# Patient Record
Sex: MALE | Race: WHITE | NOT HISPANIC OR LATINO | URBAN - METROPOLITAN AREA
[De-identification: names, ages, dates, MRNs, and addresses within clinical notes are randomized per-mention and may not be internally consistent; named-entity substitution may affect disease eponyms.]

---

## 2022-10-26 ENCOUNTER — INPATIENT (INPATIENT)
Facility: HOSPITAL | Age: 72
LOS: 0 days | Discharge: HOME | End: 2022-10-27
Attending: HOSPITALIST | Admitting: HOSPITALIST

## 2022-10-26 ENCOUNTER — EMERGENCY (EMERGENCY)
Facility: HOSPITAL | Age: 72
LOS: 1 days | Discharge: ANOTHER HOSPITAL | End: 2022-10-26
Attending: STUDENT IN AN ORGANIZED HEALTH CARE EDUCATION/TRAINING PROGRAM
Payer: MEDICARE

## 2022-10-26 VITALS
RESPIRATION RATE: 15 BRPM | HEART RATE: 72 BPM | OXYGEN SATURATION: 97 % | DIASTOLIC BLOOD PRESSURE: 82 MMHG | TEMPERATURE: 99 F | SYSTOLIC BLOOD PRESSURE: 140 MMHG

## 2022-10-26 VITALS
SYSTOLIC BLOOD PRESSURE: 143 MMHG | HEIGHT: 68 IN | DIASTOLIC BLOOD PRESSURE: 80 MMHG | TEMPERATURE: 98 F | WEIGHT: 214.95 LBS | HEART RATE: 89 BPM | OXYGEN SATURATION: 97 %

## 2022-10-26 VITALS
DIASTOLIC BLOOD PRESSURE: 83 MMHG | HEART RATE: 88 BPM | OXYGEN SATURATION: 97 % | TEMPERATURE: 98 F | RESPIRATION RATE: 15 BRPM | SYSTOLIC BLOOD PRESSURE: 158 MMHG

## 2022-10-26 DIAGNOSIS — I25.10 ATHEROSCLEROTIC HEART DISEASE OF NATIVE CORONARY ARTERY WITHOUT ANGINA PECTORIS: ICD-10-CM

## 2022-10-26 DIAGNOSIS — T17.908A UNSPECIFIED FOREIGN BODY IN RESPIRATORY TRACT, PART UNSPECIFIED CAUSING OTHER INJURY, INITIAL ENCOUNTER: ICD-10-CM

## 2022-10-26 DIAGNOSIS — Z95.5 PRESENCE OF CORONARY ANGIOPLASTY IMPLANT AND GRAFT: Chronic | ICD-10-CM

## 2022-10-26 LAB
ALBUMIN SERPL ELPH-MCNC: 4 G/DL — SIGNIFICANT CHANGE UP (ref 3.3–5)
ALBUMIN SERPL ELPH-MCNC: 4.5 G/DL — SIGNIFICANT CHANGE UP (ref 3.3–5)
ALP SERPL-CCNC: 74 U/L — SIGNIFICANT CHANGE UP (ref 40–120)
ALP SERPL-CCNC: 87 U/L — SIGNIFICANT CHANGE UP (ref 40–120)
ALT FLD-CCNC: 27 U/L — SIGNIFICANT CHANGE UP (ref 4–41)
ALT FLD-CCNC: 32 U/L — SIGNIFICANT CHANGE UP (ref 10–45)
ANION GAP SERPL CALC-SCNC: 11 MMOL/L — SIGNIFICANT CHANGE UP (ref 7–14)
ANION GAP SERPL CALC-SCNC: 15 MMOL/L — SIGNIFICANT CHANGE UP (ref 5–17)
APTT BLD: 27.9 SEC — SIGNIFICANT CHANGE UP (ref 27–36.3)
APTT BLD: 31.1 SEC — SIGNIFICANT CHANGE UP (ref 27.5–35.5)
AST SERPL-CCNC: 21 U/L — SIGNIFICANT CHANGE UP (ref 4–40)
AST SERPL-CCNC: 31 U/L — SIGNIFICANT CHANGE UP (ref 10–40)
BASOPHILS # BLD AUTO: 0.05 K/UL — SIGNIFICANT CHANGE UP (ref 0–0.2)
BASOPHILS # BLD AUTO: 0.06 K/UL — SIGNIFICANT CHANGE UP (ref 0–0.2)
BASOPHILS NFR BLD AUTO: 0.7 % — SIGNIFICANT CHANGE UP (ref 0–2)
BASOPHILS NFR BLD AUTO: 0.9 % — SIGNIFICANT CHANGE UP (ref 0–2)
BILIRUB SERPL-MCNC: 1.8 MG/DL — HIGH (ref 0.2–1.2)
BILIRUB SERPL-MCNC: 2.1 MG/DL — HIGH (ref 0.2–1.2)
BUN SERPL-MCNC: 14 MG/DL — SIGNIFICANT CHANGE UP (ref 7–23)
CALCIUM SERPL-MCNC: 9.3 MG/DL — SIGNIFICANT CHANGE UP (ref 8.4–10.5)
CALCIUM SERPL-MCNC: 9.9 MG/DL — SIGNIFICANT CHANGE UP (ref 8.4–10.5)
CHLORIDE SERPL-SCNC: 100 MMOL/L — SIGNIFICANT CHANGE UP (ref 96–108)
CHLORIDE SERPL-SCNC: 105 MMOL/L — SIGNIFICANT CHANGE UP (ref 98–107)
CO2 SERPL-SCNC: 21 MMOL/L — LOW (ref 22–31)
CO2 SERPL-SCNC: 23 MMOL/L — SIGNIFICANT CHANGE UP (ref 22–31)
CREAT SERPL-MCNC: 1.05 MG/DL — SIGNIFICANT CHANGE UP (ref 0.5–1.3)
CREAT SERPL-MCNC: 1.09 MG/DL — SIGNIFICANT CHANGE UP (ref 0.5–1.3)
EGFR: 73 ML/MIN/1.73M2 — SIGNIFICANT CHANGE UP
EGFR: 76 ML/MIN/1.73M2 — SIGNIFICANT CHANGE UP
EOSINOPHIL # BLD AUTO: 0.21 K/UL — SIGNIFICANT CHANGE UP (ref 0–0.5)
EOSINOPHIL # BLD AUTO: 0.24 K/UL — SIGNIFICANT CHANGE UP (ref 0–0.5)
EOSINOPHIL NFR BLD AUTO: 3.1 % — SIGNIFICANT CHANGE UP (ref 0–6)
EOSINOPHIL NFR BLD AUTO: 3.5 % — SIGNIFICANT CHANGE UP (ref 0–6)
FLUAV AG NPH QL: SIGNIFICANT CHANGE UP
FLUBV AG NPH QL: SIGNIFICANT CHANGE UP
GLUCOSE SERPL-MCNC: 153 MG/DL — HIGH (ref 70–99)
GLUCOSE SERPL-MCNC: 92 MG/DL — SIGNIFICANT CHANGE UP (ref 70–99)
HCT VFR BLD CALC: 42.4 % — SIGNIFICANT CHANGE UP (ref 39–50)
HCT VFR BLD CALC: 46 % — SIGNIFICANT CHANGE UP (ref 39–50)
HGB BLD-MCNC: 14.4 G/DL — SIGNIFICANT CHANGE UP (ref 13–17)
HGB BLD-MCNC: 15.1 G/DL — SIGNIFICANT CHANGE UP (ref 13–17)
IANC: 4.76 K/UL — SIGNIFICANT CHANGE UP (ref 1.8–7.4)
IMM GRANULOCYTES NFR BLD AUTO: 0.4 % — SIGNIFICANT CHANGE UP (ref 0–0.9)
IMM GRANULOCYTES NFR BLD AUTO: 0.6 % — SIGNIFICANT CHANGE UP (ref 0–0.9)
INR BLD: 1.2 RATIO — HIGH (ref 0.88–1.16)
INR BLD: 1.26 RATIO — HIGH (ref 0.88–1.16)
LYMPHOCYTES # BLD AUTO: 1.09 K/UL — SIGNIFICANT CHANGE UP (ref 1–3.3)
LYMPHOCYTES # BLD AUTO: 1.13 K/UL — SIGNIFICANT CHANGE UP (ref 1–3.3)
LYMPHOCYTES # BLD AUTO: 16.2 % — SIGNIFICANT CHANGE UP (ref 13–44)
LYMPHOCYTES # BLD AUTO: 16.4 % — SIGNIFICANT CHANGE UP (ref 13–44)
MCHC RBC-ENTMCNC: 29.7 PG — SIGNIFICANT CHANGE UP (ref 27–34)
MCHC RBC-ENTMCNC: 29.9 PG — SIGNIFICANT CHANGE UP (ref 27–34)
MCHC RBC-ENTMCNC: 32.8 GM/DL — SIGNIFICANT CHANGE UP (ref 32–36)
MCHC RBC-ENTMCNC: 34 GM/DL — SIGNIFICANT CHANGE UP (ref 32–36)
MCV RBC AUTO: 88.1 FL — SIGNIFICANT CHANGE UP (ref 80–100)
MCV RBC AUTO: 90.4 FL — SIGNIFICANT CHANGE UP (ref 80–100)
MONOCYTES # BLD AUTO: 0.65 K/UL — SIGNIFICANT CHANGE UP (ref 0–0.9)
MONOCYTES # BLD AUTO: 0.7 K/UL — SIGNIFICANT CHANGE UP (ref 0–0.9)
MONOCYTES NFR BLD AUTO: 10.1 % — SIGNIFICANT CHANGE UP (ref 2–14)
MONOCYTES NFR BLD AUTO: 9.7 % — SIGNIFICANT CHANGE UP (ref 2–14)
NEUTROPHILS # BLD AUTO: 4.66 K/UL — SIGNIFICANT CHANGE UP (ref 1.8–7.4)
NEUTROPHILS # BLD AUTO: 4.76 K/UL — SIGNIFICANT CHANGE UP (ref 1.8–7.4)
NEUTROPHILS NFR BLD AUTO: 68.9 % — SIGNIFICANT CHANGE UP (ref 43–77)
NEUTROPHILS NFR BLD AUTO: 69.5 % — SIGNIFICANT CHANGE UP (ref 43–77)
NRBC # BLD: 0 /100 WBCS — SIGNIFICANT CHANGE UP (ref 0–0)
NRBC # FLD: 0 K/UL — SIGNIFICANT CHANGE UP (ref 0–0)
PLATELET # BLD AUTO: 218 K/UL — SIGNIFICANT CHANGE UP (ref 150–400)
PLATELET # BLD AUTO: 221 K/UL — SIGNIFICANT CHANGE UP (ref 150–400)
POTASSIUM SERPL-MCNC: 4 MMOL/L — SIGNIFICANT CHANGE UP (ref 3.5–5.3)
POTASSIUM SERPL-MCNC: 4.6 MMOL/L — SIGNIFICANT CHANGE UP (ref 3.5–5.3)
POTASSIUM SERPL-SCNC: 4 MMOL/L — SIGNIFICANT CHANGE UP (ref 3.5–5.3)
POTASSIUM SERPL-SCNC: 4.6 MMOL/L — SIGNIFICANT CHANGE UP (ref 3.5–5.3)
PROT SERPL-MCNC: 6.8 G/DL — SIGNIFICANT CHANGE UP (ref 6–8.3)
PROT SERPL-MCNC: 7.5 G/DL — SIGNIFICANT CHANGE UP (ref 6–8.3)
PROTHROM AB SERPL-ACNC: 13.9 SEC — HIGH (ref 10.5–13.4)
PROTHROM AB SERPL-ACNC: 14.5 SEC — HIGH (ref 10.5–13.4)
RBC # BLD: 4.81 M/UL — SIGNIFICANT CHANGE UP (ref 4.2–5.8)
RBC # BLD: 5.09 M/UL — SIGNIFICANT CHANGE UP (ref 4.2–5.8)
RBC # FLD: 13.2 % — SIGNIFICANT CHANGE UP (ref 10.3–14.5)
RBC # FLD: 13.4 % — SIGNIFICANT CHANGE UP (ref 10.3–14.5)
RSV RNA NPH QL NAA+NON-PROBE: SIGNIFICANT CHANGE UP
SARS-COV-2 RNA SPEC QL NAA+PROBE: SIGNIFICANT CHANGE UP
SODIUM SERPL-SCNC: 136 MMOL/L — SIGNIFICANT CHANGE UP (ref 135–145)
SODIUM SERPL-SCNC: 139 MMOL/L — SIGNIFICANT CHANGE UP (ref 135–145)
WBC # BLD: 6.71 K/UL — SIGNIFICANT CHANGE UP (ref 3.8–10.5)
WBC # BLD: 6.91 K/UL — SIGNIFICANT CHANGE UP (ref 3.8–10.5)
WBC # FLD AUTO: 6.71 K/UL — SIGNIFICANT CHANGE UP (ref 3.8–10.5)
WBC # FLD AUTO: 6.91 K/UL — SIGNIFICANT CHANGE UP (ref 3.8–10.5)

## 2022-10-26 PROCEDURE — 85025 COMPLETE CBC W/AUTO DIFF WBC: CPT

## 2022-10-26 PROCEDURE — 99223 1ST HOSP IP/OBS HIGH 75: CPT

## 2022-10-26 PROCEDURE — 85610 PROTHROMBIN TIME: CPT

## 2022-10-26 PROCEDURE — 36415 COLL VENOUS BLD VENIPUNCTURE: CPT

## 2022-10-26 PROCEDURE — 93010 ELECTROCARDIOGRAM REPORT: CPT

## 2022-10-26 PROCEDURE — 86850 RBC ANTIBODY SCREEN: CPT

## 2022-10-26 PROCEDURE — 71250 CT THORAX DX C-: CPT | Mod: MA

## 2022-10-26 PROCEDURE — 80053 COMPREHEN METABOLIC PANEL: CPT

## 2022-10-26 PROCEDURE — 99285 EMERGENCY DEPT VISIT HI MDM: CPT | Mod: FS

## 2022-10-26 PROCEDURE — 99285 EMERGENCY DEPT VISIT HI MDM: CPT | Mod: 25

## 2022-10-26 PROCEDURE — 99285 EMERGENCY DEPT VISIT HI MDM: CPT

## 2022-10-26 PROCEDURE — 85730 THROMBOPLASTIN TIME PARTIAL: CPT

## 2022-10-26 PROCEDURE — 71045 X-RAY EXAM CHEST 1 VIEW: CPT | Mod: 26

## 2022-10-26 PROCEDURE — 71250 CT THORAX DX C-: CPT | Mod: 26,MA

## 2022-10-26 PROCEDURE — 86901 BLOOD TYPING SEROLOGIC RH(D): CPT

## 2022-10-26 PROCEDURE — 93005 ELECTROCARDIOGRAM TRACING: CPT

## 2022-10-26 PROCEDURE — 71045 X-RAY EXAM CHEST 1 VIEW: CPT

## 2022-10-26 PROCEDURE — 87637 SARSCOV2&INF A&B&RSV AMP PRB: CPT

## 2022-10-26 PROCEDURE — 99284 EMERGENCY DEPT VISIT MOD MDM: CPT | Mod: GC

## 2022-10-26 PROCEDURE — 86900 BLOOD TYPING SEROLOGIC ABO: CPT

## 2022-10-26 RX ORDER — SODIUM CHLORIDE 9 MG/ML
1000 INJECTION, SOLUTION INTRAVENOUS
Refills: 0 | Status: DISCONTINUED | OUTPATIENT
Start: 2022-10-26 | End: 2022-10-27

## 2022-10-26 RX ORDER — DEXTROSE 50 % IN WATER 50 %
15 SYRINGE (ML) INTRAVENOUS ONCE
Refills: 0 | Status: DISCONTINUED | OUTPATIENT
Start: 2022-10-26 | End: 2022-10-27

## 2022-10-26 RX ORDER — DEXTROSE 50 % IN WATER 50 %
25 SYRINGE (ML) INTRAVENOUS ONCE
Refills: 0 | Status: DISCONTINUED | OUTPATIENT
Start: 2022-10-26 | End: 2022-10-27

## 2022-10-26 RX ORDER — GLUCAGON INJECTION, SOLUTION 0.5 MG/.1ML
1 INJECTION, SOLUTION SUBCUTANEOUS ONCE
Refills: 0 | Status: DISCONTINUED | OUTPATIENT
Start: 2022-10-26 | End: 2022-10-27

## 2022-10-26 RX ORDER — INSULIN LISPRO 100/ML
VIAL (ML) SUBCUTANEOUS
Refills: 0 | Status: DISCONTINUED | OUTPATIENT
Start: 2022-10-26 | End: 2022-10-27

## 2022-10-26 RX ORDER — DEXTROSE 50 % IN WATER 50 %
12.5 SYRINGE (ML) INTRAVENOUS ONCE
Refills: 0 | Status: DISCONTINUED | OUTPATIENT
Start: 2022-10-26 | End: 2022-10-27

## 2022-10-26 RX ORDER — INSULIN LISPRO 100/ML
VIAL (ML) SUBCUTANEOUS AT BEDTIME
Refills: 0 | Status: DISCONTINUED | OUTPATIENT
Start: 2022-10-26 | End: 2022-10-27

## 2022-10-26 NOTE — H&P ADULT - PROBLEM SELECTOR PLAN 6
Pt on Eliquis 5 mg BID but does not know the exact indication for it. Pt reports that he started Eliquis ~6 months ago after he was told that he had an abnormal rhythm. Possibly afib.   - Per cardiology recs, hold Eliquis for now, as pt also pending possible flex bronchoscopy  - Given pt is on DAPT with ASA and Plavix, will need to follow up with pt's cardiologist (Dr. Stein in [facility] [phone]) regarding exact indication for Eliquis and whether current regimen of ASA, Plavix, and Eliquis should be adjusted due to bleeding risk

## 2022-10-26 NOTE — H&P ADULT - NSHPREVIEWOFSYSTEMS_GEN_ALL_CORE
Constitutional: No generalized weakness, fevers, chills, or weight loss  Eyes: No visual changes, double vision, or eye pain  Ears, Nose, Mouth, Throat: No runny nose, sinus pain, ear pain, tinnitus, sore throat, dysphagia, or odynophagia  Cardiovascular: No chest pain, palpitations, or LE edema  Respiratory: No cough, wheezing, hemoptysis, or shortness of breath  Gastrointestinal: No abdominal pain, dysphagia, anorexia, nausea/vomiting, diarrhea/constipation, hematemesis, melena, or BRBPR  Genitourinary: No dysuria, frequency, urgency, or hematuria  Musculoskeletal: No joint pain, joint swelling, or decreased ROM  Skin: No pruritus, rashes, lesions, or wounds  Neurologic:  No seizures, headache, paresthesias, numbness, or limb weakness  Psychiatric: No depression, anxiety, difficulty concentrating, anhedonia, or lack of energy  Endocrine: No heat/cold intolerance, mood swings, sweats, polydipsia, or polyuria  Hematologic/lymphatic: No purpura, petechia, or prolonged or excessive bleeding after dental extraction / injury  Allergic/Immunologic: No anaphylaxis or allergic response to materials, foods, animals    Positives and pertinent negatives noted and all other systems negative. Constitutional: No generalized weakness, fevers, chills, or weight loss  Eyes: No visual changes, double vision, or eye pain  Ears, Nose, Mouth, Throat: No runny nose, sinus pain, ear pain, tinnitus, sore throat, dysphagia, or odynophagia  Cardiovascular: No chest pain, palpitations, or LE edema  Respiratory: No cough, wheezing, hemoptysis, or shortness of breath  Gastrointestinal: No abdominal pain, nausea/vomiting, diarrhea/constipation, hematemesis, melena, or BRBPR  Genitourinary: No dysuria, frequency, urgency, or hematuria  Musculoskeletal: No back pain or joint pain, swelling, or decreased ROM  Skin: No pruritus or rashes  Neurologic: No syncope, seizures, headaches, paresthesias, numbness, or limb weakness  Psychiatric: No depression, anxiety, or agitation  Endocrine: No heat/cold intolerance, mood swings, sweats, polydipsia, or polyuria  Hematologic/lymphatic: No purpura, petechia, or prolonged or excessive bleeding after dental extraction / injury  Allergic/Immunologic: No anaphylaxis or allergic response to materials, foods, animals    Positives and pertinent negatives noted and all other systems negative.

## 2022-10-26 NOTE — ED ADULT TRIAGE NOTE - INTERNATIONAL TRAVEL
Unable to Assess Winlevi Counseling:  I discussed with the patient the risks of topical clascoterone including but not limited to erythema, scaling, itching, and stinging. Patient voiced their understanding.

## 2022-10-26 NOTE — H&P ADULT - PROBLEM SELECTOR PLAN 4
Pt on Metformin and Glipizide at home.  - Hold oral hypoglycemic agents while inpatient  - Start low-dose ISS and FS checks q6hrs while NPO and qAC TID and qHS while on PO diet  - Check A1c

## 2022-10-26 NOTE — H&P ADULT - HISTORY OF PRESENT ILLNESS
73 yo man with history of CAD s/p LOUISE to [facility] (July 2022, on ASA and Plavix), ?afib (on Eliquis), type 2 DM (not on insulin), and HTN presents as a transfer from [facility] ED for flex bronchoscopy by interventional pulmonology for removal of dental crown within the bronchus intermedius. Pt states that while he was driving Wednesday afternoon, he suddenly aspirated on one of his dental crowns. Pt denies any symptoms after the episode. No chest pain, shortness of breath, stridor, drooling, dysphagia, odynophagia, cough, hemoptysis, or vomiting. Pt was able to drive himself to [facility] ED from [place]. At [facility] ED, pt had a CT chest noncontrast that showed a 1 cm foreign body (tooth crown) within the bronchus intermedius. Pt was evaluated by pulmonology there and recommended to be transferred to [facility] ED to be further evaluated by interventional pulmonology. Pt is now pending a flex bronchoscopy. Pt continues to be asymptomatic. Denies any recent fevers, chills, abdominal pain, nausea, vomiting, diarrhea, constipation, melena, BRBPR, or urinary symptoms.     Of note, pt is on Eliquis 5 mg BID but does not know the exact indication for it. Pt reports that he started Eliquis ~6 months ago after he was told that he had an abnormal rhythm.     In [facility] ED,  T 98F, HR 88, /83, RR 15, SpO2 97% RA.

## 2022-10-26 NOTE — ED PROVIDER NOTE - CLINICAL SUMMARY MEDICAL DECISION MAKING FREE TEXT BOX
Girish Malloy MD: 72-year-old male with past medical history of CAD status post stents on Plavix, HTN/HLD who presents with some difficulty swallowing with some chest pain after swallowing what he states he thinks was his tooth crown/filling. Patient tolerating secretions not hypoxic or any acute distress and nontoxic-appearing.  Bedside x-ray showed what appears to be a hyperechoic structure possibly in the right bronchus.  Patient ordered for Noncon CT pulm consulted will get basic labs and EKG obtained. Girish Malloy MD: 72-year-old male with past medical history of CAD status post stents on Plavix, HTN/HLD who presents with some difficulty swallowing with some chest pain after swallowing what he states he thinks was his tooth crown/filling. Patient tolerating secretions not hypoxic or any acute distress and nontoxic-appearing.  Bedside x-ray showed what appears to be a hyperechoic structure possibly in the right bronchus.  Patient ordered for Noncon CT, pulm consulted will get basic labs and EKG obtained.

## 2022-10-26 NOTE — ED ADULT NURSE NOTE - CHIEF COMPLAINT QUOTE
EMS states" patient is a transfer from [place] for Foreign body in right bronchus" patient crown  from dental found in his right bronchi. patient denies any complaints. IV to left AC 18 Angiocath noted.

## 2022-10-26 NOTE — ED ADULT NURSE NOTE - NSIMPLEMENTINTERV_GEN_ALL_ED
Implemented All Universal Safety Interventions:  Waterproof to call system. Call bell, personal items and telephone within reach. Instruct patient to call for assistance. Room bathroom lighting operational. Non-slip footwear when patient is off stretcher. Physically safe environment: no spills, clutter or unnecessary equipment. Stretcher in lowest position, wheels locked, appropriate side rails in place.

## 2022-10-26 NOTE — H&P ADULT - ASSESSMENT
73 yo man with history of CAD s/p LOUISE to [facility] (July 2022, on ASA and Plavix), ?afib (on Eliquis), type 2 DM (not on insulin), and HTN presents as a transfer from [facility] ED for flex bronchoscopy by interventional pulmonology for removal of dental crown within the bronchus intermedius.

## 2022-10-26 NOTE — H&P ADULT - PROBLEM SELECTOR PLAN 3
S/p LOUISE to [facility] in July 2022 after an abnormal nuclear stress test. On ASA and Plavix. Pt with no anginal symptoms at this time.  - C/w ASA 81 mg daily and Plavix 75 mg daily   - C/w simvastatin 10 mg qHS for now. Unclear why pt is not on a high-intensity statin. Check lipid profile. Will need to follow up with pt's cardiologist (Dr. Stein in [facility] [phone]).  - C/w metoprolol succinate 50 mg daily  - Cardiology consulted, appreciate prelim recs. F/u final recs, including if pt requires any cardiac workup prior to flex bronchoscopy. S/p LOUISE to [facility] in July 2022 after an abnormal nuclear stress test. On ASA and Plavix. Pt with no anginal symptoms at this time.  - C/w ASA 81 mg daily and Plavix 75 mg daily   - C/w simvastatin 10 mg qHS for now. Unclear why pt is not on a high-intensity statin. Check lipid profile. Will need to follow up with pt's cardiologist (Dr. Stein in [facility] [phone]).  - C/w metoprolol succinate 50 mg daily  - TTE ordered per cardiology recs  - Cardiology consulted, appreciate prelim recs. F/u final recs, including if pt requires any further cardiac workup besides TTE prior to flex bronchoscopy.

## 2022-10-26 NOTE — H&P ADULT - PROBLEM SELECTOR PLAN 8
- DVT ppx: Pt on Eliquis at home. Hold Eliquis for now. Will need to follow up with pt's cardiologist (Dr. Stein in [facility] [phone]) regarding exact indication for Eliquis and whether current regimen of ASA, Plavix, and Eliquis should be adjusted due to bleeding risk.  - Diet: NPO for now pending possible flex bronchoscopy

## 2022-10-26 NOTE — ED PROVIDER NOTE - NS ED ROS FT
Review of Systems:  - Constitutional: no chills, no fever, no night sweats, no weight loss  - Mouth/Throat: no sore throat  - Cardiovascular: + chest pain, no edema   - Respiratory: + cough, + shortness of breath  - Gastrointestinal: no abdominal pain, no diarrhea, no melena, no nausea, no vomiting  - Genitourinary:  no dysuria, no hematuria  - MSK: FROM of all extremities, no muscle or joint pain  - Skin: no rash  - Neurological: no change in level of consciousness, no headache

## 2022-10-26 NOTE — ED ADULT NURSE NOTE - OBJECTIVE STATEMENT
break coverage RN: samson A&ox4, transferred from [place] ED for pulmonology consult. pt states he inhaled crown from tooth today. pt denies cough, or blood in mucus. pt denies Chest pain and SOB. pt denies H/A, Dizziness, lightheadedness, and radiating chest pain. breathing is spontaneous and unlabored. sating 99% on RA. bilateral pedal and radial pulses palpable and strong. left 20g IV placed  by [facility]. IV is patent, no signs of infection, redness, or infiltration/ Labs drawn and sent as per ordered. Bed in lowest position, call bell within reach, all other safety and comfort measures provided. awaiting labs results and further orders. report given to primary RN.

## 2022-10-26 NOTE — H&P ADULT - PROBLEM SELECTOR PLAN 2
Pt with isolated t bili elevation to 1.8. Pt with no abdominal complaints or abdominal tenderness on exam. 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  - Trend LFTs for now

## 2022-10-26 NOTE — H&P ADULT - NSHPSOCIALHISTORY_GEN_ALL_CORE
Denies any history of tobacco or illicit drug use.  Drinks alcohol seldomly. 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

## 2022-10-26 NOTE — H&P ADULT - NSHPLABSRESULTS_GEN_ALL_CORE
EKG personally reviewed.    Labs personally reviewed.                        14.4   6.91  )-----------( 218      ( 26 Oct 2022 19:30 )             42.4     10-26    139  |  105  |  14  ----------------------------<  92  4.0   |  23  |  1.05    Ca    9.3      26 Oct 2022 19:30    TPro  6.8  /  Alb  4.0  /  TBili  1.8<H>  /  DBili  x   /  AST  21  /  ALT  27  /  AlkPhos  74  10-26    Imaging personally reviewed.  ACC: [id number] EXAM:  CT CHEST                        PROCEDURE DATE:  10/26/2022    FINDINGS:  AIRWAYS, LUNGS and PLEURA: There is a 1 cm foreign body (tooth crown) within the bronchus intermedius, at the bifurcation. The airways are otherwise patent. Clear lungs. No atelectasis or consolidation. No pleural effusion.    MEDIASTINUM AND COLLETTE: No lymphadenopathy.    HEART AND VESSELS: Heart size is normal. No pericardial effusion. Thoracic aorta and pulmonary artery normal in diameter. Mild coronary calcification.    VISUALIZED UPPER ABDOMEN: Within normal limits.    CHEST WALL AND BONES: No aggressive osseous lesion.    LOWER NECK: The right lobe of the thyroid gland is mildly enlarged with substernal extension.    IMPRESSION:  1 cm foreign body (tooth crown) within the bronchus intermedius.    ACC: [id number] EXAM:  XR CHEST AP OR PA 1V                        PROCEDURE DATE:  10/26/2022    FINDINGS:  LINES/TUBES/SUPPORT DEVICES: None    LUNGS: Radiopaque foreign body likely within the bronchus intermedius. The lungs are clear.    PLEURA: No pleural effusion or pneumothorax.    HEART AND MEDIASTINUM: Heart size is within normal limits.    BONES: No acute bony abnormality.    IMPRESSION:  Radiopaque foreign body, representing ingested crown, likely within the bronchus intermedius. Consider bronchoscopy or CT of the chest for further evaluation, depending on the patient's clinical status. EKG personally reviewed.  NSR at 74 bpm, TWI in III and aVF, no STD or NEETU, QTc 468 ms.    Labs personally reviewed.                        14.4   6.91  )-----------( 218      ( 26 Oct 2022 19:30 )             42.4     10-26    139  |  105  |  14  ----------------------------<  92  4.0   |  23  |  1.05    Ca    9.3      26 Oct 2022 19:30    TPro  6.8  /  Alb  4.0  /  TBili  1.8<H>  /  DBili  x   /  AST  21  /  ALT  27  /  AlkPhos  74  10-26    Imaging personally reviewed.  ACC: [id number] EXAM:  CT CHEST                        PROCEDURE DATE:  10/26/2022    FINDINGS:  AIRWAYS, LUNGS and PLEURA: There is a 1 cm foreign body (tooth crown) within the bronchus intermedius, at the bifurcation. The airways are otherwise patent. Clear lungs. No atelectasis or consolidation. No pleural effusion.    MEDIASTINUM AND COLLETTE: No lymphadenopathy.    HEART AND VESSELS: Heart size is normal. No pericardial effusion. Thoracic aorta and pulmonary artery normal in diameter. Mild coronary calcification.    VISUALIZED UPPER ABDOMEN: Within normal limits.    CHEST WALL AND BONES: No aggressive osseous lesion.    LOWER NECK: The right lobe of the thyroid gland is mildly enlarged with substernal extension.    IMPRESSION:  1 cm foreign body (tooth crown) within the bronchus intermedius.    ACC: [id number] EXAM:  XR CHEST AP OR PA 1V                        PROCEDURE DATE:  10/26/2022    FINDINGS:  LINES/TUBES/SUPPORT DEVICES: None    LUNGS: Radiopaque foreign body likely within the bronchus intermedius. The lungs are clear.    PLEURA: No pleural effusion or pneumothorax.    HEART AND MEDIASTINUM: Heart size is within normal limits.    BONES: No acute bony abnormality.    IMPRESSION:  Radiopaque foreign body, representing ingested crown, likely within the bronchus intermedius. Consider bronchoscopy or CT of the chest for further evaluation, depending on the patient's clinical status.

## 2022-10-26 NOTE — CONSULT NOTE ADULT - SUBJECTIVE AND OBJECTIVE BOX
CHIEF COMPLAINT:Patient is a 72y old  Male who presents with a chief complaint of     HPI:      PAST MEDICAL & SURGICAL HISTORY:  HTN (hypertension)      Hyperlipemia      CAD (coronary artery disease)      Stented coronary artery          FAMILY HISTORY:      SOCIAL HISTORY:  Smoking: [ ] Never Smoked [ ] Former Smoker (__ packs x ___ years) [ ] Current Smoker  (__ packs x ___ years)  Substance Use: [ ] Never Used [ ] Used ____  EtOH Use:  Marital Status: [ ] Single [ ]  [ ]  [ ]   Sexual History:   Occupation:  Recent Travel:  Country of Birth:  Advance Directives:    Allergies    No Known Allergies    Intolerances        HOME MEDICATIONS:  Home Medications:      REVIEW OF SYSTEMS:  Constitutional: [ ] negative [ ] fevers [ ] chills [ ] weight loss [ ] weight gain  HEENT: [ ] negative [ ] dry eyes [ ] eye irritation [ ] postnasal drip [ ] nasal congestion  CV: [ ] negative  [ ] chest pain [ ] orthopnea [ ] palpitations [ ] murmur  Resp: [ ] negative [ ] cough [ ] shortness of breath [ ] dyspnea [ ] wheezing [ ] sputum [ ] hemoptysis  GI: [ ] negative [ ] nausea [ ] vomiting [ ] diarrhea [ ] constipation [ ] abd pain [ ] dysphagia   : [ ] negative [ ] dysuria [ ] nocturia [ ] hematuria [ ] increased urinary frequency  Musculoskeletal: [ ] negative [ ] back pain [ ] myalgias [ ] arthralgias [ ] fracture  Skin: [ ] negative [ ] rash [ ] itch  Neurological: [ ] negative [ ] headache [ ] dizziness [ ] syncope [ ] weakness [ ] numbness  Psychiatric: [ ] negative [ ] anxiety [ ] depression  Endocrine: [ ] negative [ ] diabetes [ ] thyroid problem  Hematologic/Lymphatic: [ ] negative [ ] anemia [ ] bleeding problem  Allergic/Immunologic: [ ] negative [ ] itchy eyes [ ] nasal discharge [ ] hives [ ] angioedema  [ ] All other systems negative  [ ] Unable to assess ROS because ________    OBJECTIVE:  ICU Vital Signs Last 24 Hrs  T(C): 36.8 (26 Oct 2022 15:22), Max: 36.8 (26 Oct 2022 15:22)  T(F): 98.2 (26 Oct 2022 15:22), Max: 98.2 (26 Oct 2022 15:22)  HR: 78 (26 Oct 2022 15:22) (78 - 89)  BP: 149/90 (26 Oct 2022 15:22) (143/80 - 149/90)  BP(mean): --  ABP: --  ABP(mean): --  RR: 17 (26 Oct 2022 15:22) (17 - 17)  SpO2: 98% (26 Oct 2022 15:22) (97% - 98%)    O2 Parameters below as of 26 Oct 2022 15:22  Patient On (Oxygen Delivery Method): room air              CAPILLARY BLOOD GLUCOSE          PHYSICAL EXAM:  GENERAL: NAD, well-groomed, well-developed  HEAD:  Atraumatic, Normocephalic  EYES: EOMI, PERRLA, conjunctiva and sclera clear  ENMT: No tonsillar erythema, exudates, or enlargement; Moist mucous membranes, Good dentition, No lesions  NECK: Supple, No JVD, Normal thyroid  CHEST/LUNG: Clear to auscultation bilaterally; No rales, rhonchi, wheezing, or rubs  HEART: Regular rate and rhythm; No murmurs, rubs, or gallops  ABDOMEN: Soft, Nontender, Nondistended; Bowel sounds present  VASCULAR:  2+ Peripheral Pulses, No clubbing, cyanosis, or edema  LYMPH: No lymphadenopathy noted  SKIN: No rashes or lesions  NERVOUS SYSTEM:  Alert & Oriented X3, Good concentration; Motor Strength 5/5 B/L upper and lower extremities; DTRs 2+ intact and symmetric    HOSPITAL MEDICATIONS:  Standing Meds:      PRN Meds:      LABS:    The Labs were reviewed by me   The Radiology was reviewed by me    EKG tracing reviewed by me    10-26    136  |  100  |  14  ----------------------------<  153<H>  4.6   |  21<L>  |  1.09    Ca    9.9      26 Oct 2022 15:25    TPro  7.5  /  Alb  4.5  /  TBili  2.1<H>  /  DBili  x   /  AST  31  /  ALT  32  /  AlkPhos  87  10-26          PT/INR - ( 26 Oct 2022 15:25 )   PT: 14.5 sec;   INR: 1.26 ratio         PTT - ( 26 Oct 2022 15:25 )  PTT:31.1 sec                                        15.1   6.71  )-----------( 221      ( 26 Oct 2022 15:25 )             46.0     CAPILLARY BLOOD GLUCOSE            MICROBIOLOGY:       RADIOLOGY:  [ ] Reviewed and interpreted by me    PULMONARY FUNCTION TESTS:    EKG:   CHIEF COMPLAINT:Patient is a 72y old  Male who presents with a chief complaint of     HPI:  72 M PMHx CAD (stent placed pda 7/27/22 on asa and plavix), HTN, HLD who presents with presumed FB aspiration of filling. Pt reports around 13:00 today, inhaled and felt like he aspirated a crown from one of his teeth, has had chest discomfort and cough since then. O2 saturation is normal. Last ate at 11:00 today. No known drug allergies. CXR with radiopaque FB seen on Rt.      PAST MEDICAL & SURGICAL HISTORY:  HTN (hypertension)      Hyperlipemia      CAD (coronary artery disease)      Stented coronary artery          FAMILY HISTORY:      SOCIAL HISTORY:  Smoking: [ ] Never Smoked [ ] Former Smoker (__ packs x ___ years) [ ] Current Smoker  (__ packs x ___ years)  Substance Use: [ ] Never Used [ ] Used ____  EtOH Use:  Marital Status: [ ] Single [ ]  [ ]  [ ]   Sexual History:   Occupation:  Recent Travel:  Country of Birth:  Advance Directives:    Allergies    No Known Allergies    Intolerances        HOME MEDICATIONS:  Home Medications:      REVIEW OF SYSTEMS:  Constitutional: [x ] negative [ ] fevers [ ] chills [ ] weight loss [ ] weight gain  HEENT: [ ] negative [ ] dry eyes [ ] eye irritation [ ] postnasal drip [ ] nasal congestion  CV: [ ] negative  [x ] chest pain [ ] orthopnea [ ] palpitations [ ] murmur  Resp: [ ] negative [x ] cough [ ] shortness of breath [ ] dyspnea [ ] wheezing [ ] sputum [ ] hemoptysis  GI: [ x] negative [ ] nausea [ ] vomiting [ ] diarrhea [ ] constipation [ ] abd pain [ ] dysphagia   : [x ] negative [ ] dysuria [ ] nocturia [ ] hematuria [ ] increased urinary frequency  Musculoskeletal: [ ] negative [ ] back pain [ ] myalgias [ ] arthralgias [ ] fracture  Skin: [ ] negative [ ] rash [ ] itch  Neurological: [ ] negative [ ] headache [ ] dizziness [ ] syncope [ ] weakness [ ] numbness  Psychiatric: [ ] negative [ ] anxiety [ ] depression  Endocrine: [ ] negative [ ] diabetes [ ] thyroid problem  Hematologic/Lymphatic: [ ] negative [ ] anemia [ ] bleeding problem  Allergic/Immunologic: [ ] negative [ ] itchy eyes [ ] nasal discharge [ ] hives [ ] angioedema  [ x] All other systems negative  [ ] Unable to assess ROS because ________    OBJECTIVE:  ICU Vital Signs Last 24 Hrs  T(C): 36.8 (26 Oct 2022 15:22), Max: 36.8 (26 Oct 2022 15:22)  T(F): 98.2 (26 Oct 2022 15:22), Max: 98.2 (26 Oct 2022 15:22)  HR: 78 (26 Oct 2022 15:22) (78 - 89)  BP: 149/90 (26 Oct 2022 15:22) (143/80 - 149/90)  BP(mean): --  ABP: --  ABP(mean): --  RR: 17 (26 Oct 2022 15:22) (17 - 17)  SpO2: 98% (26 Oct 2022 15:22) (97% - 98%)    O2 Parameters below as of 26 Oct 2022 15:22  Patient On (Oxygen Delivery Method): room air              CAPILLARY BLOOD GLUCOSE          PHYSICAL EXAM:  GENERAL: NAD, well-groomed, well-developed  HEAD:  Atraumatic, Normocephalic  EYES: EOMI, PERRLA, conjunctiva and sclera clear  ENMT: No tonsillar erythema, exudates, or enlargement; Moist mucous membranes, Good dentition, No lesions  NECK: Supple, No JVD  CHEST/LUNG: inspiratory and expiratory squeak Rt lower lung field  HEART: Regular rate and rhythm; No murmurs, rubs, or gallops  ABDOMEN: Soft, Nontender, Nondistended  VASCULAR:  2+ Peripheral Pulses, No clubbing, cyanosis, or edema  LYMPH: No lymphadenopathy noted  SKIN: No rashes or lesions  NERVOUS SYSTEM:  Alert & Oriented X3    HOSPITAL MEDICATIONS:  Standing Meds:      PRN Meds:      LABS:    The Labs were reviewed by me   The Radiology was reviewed by me    EKG tracing reviewed by me    10-26    136  |  100  |  14  ----------------------------<  153<H>  4.6   |  21<L>  |  1.09    Ca    9.9      26 Oct 2022 15:25    TPro  7.5  /  Alb  4.5  /  TBili  2.1<H>  /  DBili  x   /  AST  31  /  ALT  32  /  AlkPhos  87  10-26          PT/INR - ( 26 Oct 2022 15:25 )   PT: 14.5 sec;   INR: 1.26 ratio         PTT - ( 26 Oct 2022 15:25 )  PTT:31.1 sec                                        15.1   6.71  )-----------( 221      ( 26 Oct 2022 15:25 )             46.0     CAPILLARY BLOOD GLUCOSE            MICROBIOLOGY:       RADIOLOGY:  [ ] Reviewed and interpreted by me    PULMONARY FUNCTION TESTS:    EKG:

## 2022-10-26 NOTE — H&P ADULT - PROBLEM SELECTOR PLAN 5
Pt on losartan-HCTZ and metoprolol succinate at home. BPs in acceptable range on admission.  - C/w losartan 100 mg daily and metoprolol succinate 50 mg daily with hold parameters  - Hold HCTZ for now while pt NPO  - Monitor VS q4hrs

## 2022-10-26 NOTE — CONSULT NOTE ADULT - ASSESSMENT
72 M PMHx CAD (stent placed pda 7/27/22 on asa and plavix), HTN, HLD who presents with presumed FB aspiration of filling.    #Presumed FB aspiration  - f/u CT noncontrast  - Needs cardiology clearance prior to procedure given recent stent placement and recommendations for antiplatelet management (?hold aspirin, likely will need to remain on plavix)  - Recommend transfer to [facility] for bronchoscopy with IP, can be performed tomorrow since pt ate too recently to perform safely today and is otherwise stable at this time  - f/u COVID-19 swab  - pre-procedure labs with cbc, cmp, coags, t+s

## 2022-10-26 NOTE — ED PROVIDER NOTE - OBJECTIVE STATEMENT
73 yo M with hx of DM, HTN, CAD with stent, presenting from [facility] for removal of dental crown from R bronchus. Patient states that he inhaled the crown at around 1 pm today. Patient asymptomatic at this time. Denies chest pain, shortness of breath, nausea, vomiting, fevers/chills. Last dose of Eliquis this morning, did not take his plavix today.

## 2022-10-26 NOTE — ED PROVIDER NOTE - ATTENDING CONTRIBUTION TO CARE
Attending note:   After face to face evaluation of this patient, I concur with above noted hx, pe, and care plan for this patient.  Baker: 72 yom with DM, HTN, CAD with stent on Eliquis and Plavix as per pt. Pt inhaled his dental crown at 1pm today. No PO intake since 11am. Pt seen at [facility] and found to have dental crown in right bronchus. Pt transferred for pulm eval and bronch. Pt has some occasional cough but no CP or SOB. Pt is well appearing, normal O2 sat, no resp distress, clear lungs, RRR, no edema.  Discussed with pulm fellow - will be scoped tomorrow, EKG, labs, covid swab and admit to medicine, pulm requesting cards clearance so will page house cards.

## 2022-10-26 NOTE — CHART NOTE - NSCHARTNOTEFT_GEN_A_CORE
Interventional Pulmonary Chart Note      Patient is booked as an add-on tomorrow for flexible bronchoscopy for foreign body removal    - please check covid swab  - please obtain CBC, BMP, type and screen, coags  - please keep NPO after midnight  - please consult cardiology for preop clearance for OR  - please call pulmonary with any questions

## 2022-10-26 NOTE — ED PROVIDER NOTE - OBJECTIVE STATEMENT
72-year-old male with past medical history of CAD status post stents on Plavix, HTN/HLD who presents with some difficulty swallowing with some chest pain after swallowing what he states he thinks was his tooth crown/filling.  Patient states that the episode happened around 1:30 PM while he was lying down and took a deep breath in.  Patient has been able tolerate secretions but has a hard time taking a deep breath in.  Patient denies any fevers, chills, abdominal pain, and has some mild coughing without any concomitant hemoptysis.

## 2022-10-26 NOTE — ED PROVIDER NOTE - PHYSICAL EXAMINATION
Gen: AAO x 3, NAD  Skin: No rashes or lesions  HEENT: NC/AT, PERRLA, EOMI, MMM  Resp: unlabored CTAB, actively coughing  Cardiac: rrr s1s2, no murmurs, rubs or gallops  GI: ND, +BS, Soft, NT  Ext: no pedal edema, FROM in all extremities  Neuro: no focal deficits

## 2022-10-26 NOTE — ED ADULT TRIAGE NOTE - CHIEF COMPLAINT QUOTE
EMS states" patient is a transfer from [place] for Foreign body in right bronchus" patient crown  from dental found in his right bronchi. patient denies any complaints. IV to left AC 18 Angiocath noted.

## 2022-10-26 NOTE — H&P ADULT - PROBLEM SELECTOR PLAN 7
- DVT ppx: Pt on Eliquis at home. Hold Eliquis for now. Will need to follow up with pt's cardiologist (Dr. Stein in [facility] [phone]) regarding exact indication for Eliquis and whether current regimen of ASA, Plavix, and Eliquis should be adjusted due to bleeding risk.  - Diet: NPO for now pending possible flex bronchoscopy CT chest showing right lobe of the thyroid gland mildly enlarged with substernal extension. Pt with no obstructive S/S.  - F/u TSH  - US thyroid as outpatient

## 2022-10-26 NOTE — H&P ADULT - NSHPPHYSICALEXAM_GEN_ALL_CORE
Vital Signs Last 24 Hrs  T(C): 36.7 (26 Oct 2022 18:50), Max: 36.7 (26 Oct 2022 18:50)  T(F): 98 (26 Oct 2022 18:50), Max: 98 (26 Oct 2022 18:50)  HR: 88 (26 Oct 2022 18:50) (88 - 88)  BP: 158/83 (26 Oct 2022 18:50) (158/83 - 158/83)  BP(mean): --  RR: 15 (26 Oct 2022 18:50) (15 - 15)  SpO2: 97% (26 Oct 2022 18:50) (97% - 97%)    PHYSICAL EXAM:  General: Awake and alert.  No acute distress.  Head: Normocephalic, atraumatic.    Eyes: PERRL.  EOMI.  No scleral icterus.  No conjunctival pallor.  Mouth: Moist MM.  No oropharyngeal exudates.    Neck: Supple.  Full range of motion.  No JVD.  No LAD.  No thyromegaly.  Trachea midline.    Heart: RRR.  Normal S1 and S2.  No murmurs, rubs, or gallops.  No LE edema b/l.   Lungs: Nonlabored breathing.  Good inspiratory effort.  CTAB.  No wheezes, crackles, or rhonchi.    Abdomen: BS+, soft, NT/ND.  No hepatomegaly.   Skin: Warm and dry.  No rashes.  Extremities: No cyanosis.  2+ peripheral pulses b/l.  Musculoskeletal: No joint deformities.  No spinal or paraspinal tenderness.  Neuro: A&Ox3.  CN II-XII intact.  5/5 motor strength in UE and LE b/l.  Tactile sensation intact in UE and LE b/l.  Cerebellar function intact as assessed by finger-to-nose test. Vital Signs Last 24 Hrs  T(C): 36.7 (26 Oct 2022 18:50), Max: 36.7 (26 Oct 2022 18:50)  T(F): 98 (26 Oct 2022 18:50), Max: 98 (26 Oct 2022 18:50)  HR: 88 (26 Oct 2022 18:50) (88 - 88)  BP: 158/83 (26 Oct 2022 18:50) (158/83 - 158/83)  BP(mean): --  RR: 15 (26 Oct 2022 18:50) (15 - 15)  SpO2: 97% (26 Oct 2022 18:50) (97% - 97%)    PHYSICAL EXAM:  General: Awake and alert.  No acute distress.  Head: Normocephalic, atraumatic.    Eyes: PERRL.  EOMI.  No scleral icterus.  No conjunctival pallor.  Mouth: Moist MM.  No oropharyngeal exudates.    Neck: Supple.  Full range of motion.  No JVD.  No LAD.  No thyromegaly.  Trachea midline.  No stridor.  Heart: RRR.  Normal S1 and S2.  No murmurs, rubs, or gallops.  No LE edema b/l.   Lungs: Nonlabored breathing.  Good inspiratory effort.  CTAB.  No wheezes, crackles, or rhonchi.    Abdomen: BS+, soft, nontender with no rebound or guarding, nondistended.  No hepatomegaly.   Skin: Warm and dry.  No rashes.  Extremities: No cyanosis.  2+ peripheral pulses b/l.  Musculoskeletal: No joint deformities.  No spinal or paraspinal tenderness.  Neuro: A&Ox3.  CN II-XII intact.  5/5 motor strength in UE and LE b/l.  Tactile sensation intact in UE and LE b/l.  No focal deficits.  Psychiatric: Normal mood, full affect.

## 2022-10-26 NOTE — CONSULT NOTE ADULT - NS ATTEND AMEND GEN_ALL_CORE FT
Personally saw and examined patient  labs and vitals reviewed  agree with above assessment and plan  plan for urgent bronch 2/2 foreign body on mainstem bronchus  pt able to ambulate more than one block w/o symptoms (>4 mets)  denies hx of vt/vf/scd  no edema, orthopnea  no murmurs on exam  recent pci for abnormal stress 7/22 on dapt, will need to cont dapt uninterrupted  on eliquis as outpt, pt unsure why, denies hx of dvt/pe, afib  ok to hold for procedure, will defer to outpt cards (Dr Stein) when to restart it if at all  pt is intermediate risk for low risk procedure and as procedure is urgent no further cardiac testing is indicated and pt may proceed  will follow with you

## 2022-10-26 NOTE — CONSULT NOTE ADULT - PROBLEM SELECTOR RECOMMENDATION 9
May hold Eliquis  Please continue Aspirin 81mg daily  Please continue Plavix 75mg daily (stent is only 3 months old and plavix must continue to prevent thrombosis)  Please continue Simvistatin 10mg daily  Please continue Metoprolol Succinate XL 50mg dialy, hold for HR <60, SBP <100  Start Losartan 100mg daily, hold for SBP <100  Please obtain medical record to include cath report, stress test and echocardiogram from Dr. Stein [phone]  Please obtain why patient is receiving Eliquis from Dr. Stein  May obtain echocardiogram to evaluate LV function  Await cardiac clearance from Cardiology attending

## 2022-10-26 NOTE — H&P ADULT - PROBLEM SELECTOR PLAN 1
Per pt, suddenly aspirated on one of his dental crowns while driving Wednesday afternoon. CT chest noncontrast showing a 1 cm foreign body (tooth crown) within the bronchus intermedius. Pt currently asymptomatic, protecting airway and maintaining SpO2 high 90s on RA.   - Interventional pulmonology consulted, appreciate recs. Plan for flex bronchoscopy for removal of dental crown pending cardiology eval.   - F/u cardiology final recs  - Keep NPO after MN  - Monitor pulse ox q4hrs for now  - Low threshold to start empiric antibiotics for aspiration PNA if pt spikes a fever

## 2022-10-26 NOTE — ED PROVIDER NOTE - PHYSICAL EXAMINATION
Gen: NAD, AAOx3, non-toxic appearing  HEENT: NCAT, normal conjunctiva, oral mucosa moist  Lung: speaking in full sentences, good aeration bilaterally, slight wheeze on the right  CV: regular rate and rhythm. cap refill <2x. peripheral pulses 2+bilaterally   Abd: soft, ND, NT  MSK: no visible deformities  Neuro: No focal deficits  Skin: Intact  Psych: normal affect

## 2022-10-26 NOTE — CONSULT NOTE ADULT - ATTENDING COMMENTS
Agree with above. 71 yo M with CAD recent stent placement on ASA and Plavix, felt he aspirated one of his dental crowns earlier today. Last meal around 11 am. Clinically stable, cough intermittently, however, O2 sats WNL.  Will need cardiac clearance prior to bronchoscopic extraction of foreign body   Plan for transfer to [facility] and procedure to be performed by Dr. Muniz, IP, tomorrow. Preprocedure labs and NPO after midnight.  d/w patient at length and he is in agreement.

## 2022-10-26 NOTE — ED ADULT NURSE REASSESSMENT NOTE - NS ED NURSE REASSESS COMMENT FT1
Received pt from previous RN, pt sitting up in bed AAOx4 breathing with ease on RA and in NAD, denies having any pain at this time, breathing well and mentating well, able to move all extremities.

## 2022-10-26 NOTE — ED PROVIDER NOTE - NSICDXPASTMEDICALHX_GEN_ALL_CORE_FT
PAST MEDICAL HISTORY:  CAD (coronary artery disease) 1 stent    Diabetes mellitus     HTN (hypertension)

## 2022-10-26 NOTE — ED PROVIDER NOTE - ATTENDING APP SHARED VISIT CONTRIBUTION OF CARE
I, Girish Malloy, performed a history and physical exam of the patient and discussed their management with the resident and /or advanced care provider. I reviewed the resident and /or ACP's note and agree with the documented findings and plan of care. I was present and available for all procedures.    See MDM for attestation.

## 2022-10-26 NOTE — ED PROVIDER NOTE - CLINICAL SUMMARY MEDICAL DECISION MAKING FREE TEXT BOX
73 yo M with hx of DM, HTN, CAD with stent, presenting from [facility] for removal of dental crown from R bronchus. No complaints at this time. VSS, satting 100% on RA, breathing even and unlabored with slight R sided wheeze. Will get pre-op labs, consult pulm and admit.

## 2022-10-26 NOTE — ED ADULT NURSE NOTE - OBJECTIVE STATEMENT
71 yo male with a PMH of CAD s/p one stent on Plavix, HTN, HLD presents to the ED ambulatory with steady gait complaining of foreign body throat. Patient reports he was laying down at around 1330 and took a deep breath. Felt something sharp go down his throat and when he looked in his mouth noticed his crown was missing. Patient reports that he has been having difficulty breathing and swallowing since. Patient sating well on RA at this time, audible wheezes can be heard upon taking a deep breath. VSS. Denies headache, dizziness, vision changes, chest pain, abdominal pain, nausea, vomiting, diarrhea, fevers, chills, dysuria, hematuria, recent illness travel or fall.

## 2022-10-26 NOTE — H&P ADULT - NSICDXPASTMEDICALHX_GEN_ALL_CORE_FT
PAST MEDICAL HISTORY:  Atrial fibrillation ?    CAD (coronary artery disease) 1 stent    Diabetes mellitus     HTN (hypertension)

## 2022-10-27 VITALS
HEART RATE: 69 BPM | TEMPERATURE: 98 F | DIASTOLIC BLOOD PRESSURE: 93 MMHG | RESPIRATION RATE: 18 BRPM | OXYGEN SATURATION: 97 % | SYSTOLIC BLOOD PRESSURE: 139 MMHG

## 2022-10-27 DIAGNOSIS — E04.9 NONTOXIC GOITER, UNSPECIFIED: ICD-10-CM

## 2022-10-27 DIAGNOSIS — E80.6 OTHER DISORDERS OF BILIRUBIN METABOLISM: ICD-10-CM

## 2022-10-27 DIAGNOSIS — Z79.01 LONG TERM (CURRENT) USE OF ANTICOAGULANTS: ICD-10-CM

## 2022-10-27 DIAGNOSIS — I10 ESSENTIAL (PRIMARY) HYPERTENSION: ICD-10-CM

## 2022-10-27 DIAGNOSIS — Z29.9 ENCOUNTER FOR PROPHYLACTIC MEASURES, UNSPECIFIED: ICD-10-CM

## 2022-10-27 DIAGNOSIS — T17.908A UNSPECIFIED FOREIGN BODY IN RESPIRATORY TRACT, PART UNSPECIFIED CAUSING OTHER INJURY, INITIAL ENCOUNTER: ICD-10-CM

## 2022-10-27 DIAGNOSIS — E11.9 TYPE 2 DIABETES MELLITUS WITHOUT COMPLICATIONS: ICD-10-CM

## 2022-10-27 LAB
A1C WITH ESTIMATED AVERAGE GLUCOSE RESULT: 6.5 % — HIGH (ref 4–5.6)
ALBUMIN SERPL ELPH-MCNC: 4.1 G/DL — SIGNIFICANT CHANGE UP (ref 3.3–5)
ALP SERPL-CCNC: 79 U/L — SIGNIFICANT CHANGE UP (ref 40–120)
ALT FLD-CCNC: 28 U/L — SIGNIFICANT CHANGE UP (ref 4–41)
ANION GAP SERPL CALC-SCNC: 12 MMOL/L — SIGNIFICANT CHANGE UP (ref 7–14)
APTT BLD: 29.8 SEC — SIGNIFICANT CHANGE UP (ref 27–36.3)
AST SERPL-CCNC: 24 U/L — SIGNIFICANT CHANGE UP (ref 4–40)
BILIRUB SERPL-MCNC: 2.4 MG/DL — HIGH (ref 0.2–1.2)
BLD GP AB SCN SERPL QL: NEGATIVE — SIGNIFICANT CHANGE UP
BUN SERPL-MCNC: 14 MG/DL — SIGNIFICANT CHANGE UP (ref 7–23)
CALCIUM SERPL-MCNC: 9.8 MG/DL — SIGNIFICANT CHANGE UP (ref 8.4–10.5)
CHLORIDE SERPL-SCNC: 103 MMOL/L — SIGNIFICANT CHANGE UP (ref 98–107)
CO2 SERPL-SCNC: 23 MMOL/L — SIGNIFICANT CHANGE UP (ref 22–31)
CREAT SERPL-MCNC: 1.09 MG/DL — SIGNIFICANT CHANGE UP (ref 0.5–1.3)
EGFR: 73 ML/MIN/1.73M2 — SIGNIFICANT CHANGE UP
ESTIMATED AVERAGE GLUCOSE: 140 — SIGNIFICANT CHANGE UP
GLUCOSE BLDC GLUCOMTR-MCNC: 136 MG/DL — HIGH (ref 70–99)
GLUCOSE SERPL-MCNC: 121 MG/DL — HIGH (ref 70–99)
HCV AB S/CO SERPL IA: 0.08 S/CO — SIGNIFICANT CHANGE UP (ref 0–0.99)
HCV AB SERPL-IMP: SIGNIFICANT CHANGE UP
INR BLD: 1.18 RATIO — HIGH (ref 0.88–1.16)
MAGNESIUM SERPL-MCNC: 2 MG/DL — SIGNIFICANT CHANGE UP (ref 1.6–2.6)
PHOSPHATE SERPL-MCNC: 4.2 MG/DL — SIGNIFICANT CHANGE UP (ref 2.5–4.5)
POTASSIUM SERPL-MCNC: 4.5 MMOL/L — SIGNIFICANT CHANGE UP (ref 3.5–5.3)
POTASSIUM SERPL-SCNC: 4.5 MMOL/L — SIGNIFICANT CHANGE UP (ref 3.5–5.3)
PROT SERPL-MCNC: 6.8 G/DL — SIGNIFICANT CHANGE UP (ref 6–8.3)
PROTHROM AB SERPL-ACNC: 13.7 SEC — HIGH (ref 10.5–13.4)
RH IG SCN BLD-IMP: POSITIVE — SIGNIFICANT CHANGE UP
SARS-COV-2 RNA SPEC QL NAA+PROBE: SIGNIFICANT CHANGE UP
SODIUM SERPL-SCNC: 138 MMOL/L — SIGNIFICANT CHANGE UP (ref 135–145)
TSH SERPL-MCNC: 1.11 UIU/ML — SIGNIFICANT CHANGE UP (ref 0.27–4.2)

## 2022-10-27 PROCEDURE — 99223 1ST HOSP IP/OBS HIGH 75: CPT | Mod: GC,25

## 2022-10-27 PROCEDURE — 99223 1ST HOSP IP/OBS HIGH 75: CPT

## 2022-10-27 PROCEDURE — 99239 HOSP IP/OBS DSCHRG MGMT >30: CPT

## 2022-10-27 PROCEDURE — 31645 BRNCHSC W/THER ASPIR 1ST: CPT | Mod: GC

## 2022-10-27 PROCEDURE — 93306 TTE W/DOPPLER COMPLETE: CPT | Mod: 26

## 2022-10-27 PROCEDURE — 31635 BRONCHOSCOPY W/FB REMOVAL: CPT | Mod: GC

## 2022-10-27 DEVICE — IMPLANTABLE DEVICE: Type: IMPLANTABLE DEVICE | Status: FUNCTIONAL

## 2022-10-27 RX ORDER — CLOPIDOGREL BISULFATE 75 MG/1
75 TABLET, FILM COATED ORAL DAILY
Refills: 0 | Status: DISCONTINUED | OUTPATIENT
Start: 2022-10-27 | End: 2022-10-27

## 2022-10-27 RX ORDER — METOPROLOL TARTRATE 50 MG
50 TABLET ORAL DAILY
Refills: 0 | Status: DISCONTINUED | OUTPATIENT
Start: 2022-10-27 | End: 2022-10-27

## 2022-10-27 RX ORDER — INSULIN LISPRO 100/ML
VIAL (ML) SUBCUTANEOUS EVERY 6 HOURS
Refills: 0 | Status: DISCONTINUED | OUTPATIENT
Start: 2022-10-27 | End: 2022-10-27

## 2022-10-27 RX ORDER — ATORVASTATIN CALCIUM 80 MG/1
40 TABLET, FILM COATED ORAL AT BEDTIME
Refills: 0 | Status: DISCONTINUED | OUTPATIENT
Start: 2022-10-27 | End: 2022-10-27

## 2022-10-27 RX ORDER — SIMVASTATIN 20 MG/1
10 TABLET, FILM COATED ORAL AT BEDTIME
Refills: 0 | Status: DISCONTINUED | OUTPATIENT
Start: 2022-10-27 | End: 2022-10-27

## 2022-10-27 RX ORDER — LOSARTAN POTASSIUM 100 MG/1
100 TABLET, FILM COATED ORAL DAILY
Refills: 0 | Status: DISCONTINUED | OUTPATIENT
Start: 2022-10-27 | End: 2022-10-27

## 2022-10-27 RX ORDER — INFLUENZA VIRUS VACCINE 15; 15; 15; 15 UG/.5ML; UG/.5ML; UG/.5ML; UG/.5ML
0.7 SUSPENSION INTRAMUSCULAR ONCE
Refills: 0 | Status: DISCONTINUED | OUTPATIENT
Start: 2022-10-27 | End: 2022-10-27

## 2022-10-27 RX ORDER — APIXABAN 2.5 MG/1
5 TABLET, FILM COATED ORAL EVERY 12 HOURS
Refills: 0 | Status: DISCONTINUED | OUTPATIENT
Start: 2022-10-27 | End: 2022-10-27

## 2022-10-27 RX ORDER — ASPIRIN/CALCIUM CARB/MAGNESIUM 324 MG
81 TABLET ORAL
Refills: 0 | Status: DISCONTINUED | OUTPATIENT
Start: 2022-10-27 | End: 2022-10-27

## 2022-10-27 RX ADMIN — Medication 50 MILLIGRAM(S): at 05:43

## 2022-10-27 RX ADMIN — LOSARTAN POTASSIUM 100 MILLIGRAM(S): 100 TABLET, FILM COATED ORAL at 05:43

## 2022-10-27 NOTE — PROGRESS NOTE ADULT - PROBLEM SELECTOR PLAN 7
CT chest showing right lobe of the thyroid gland mildly enlarged with substernal extension. Pt with no obstructive S/S.  -TSH wnl 1.11  - US thyroid as outpatient

## 2022-10-27 NOTE — DISCHARGE NOTE PROVIDER - CARE PROVIDER_API CALL
Dr Stein,   [phone]   cardiologist  Phone: (   )    -  Fax: (   )    -  Follow Up Time:     primary care provider,   Phone: (   )    -  Fax: (   )    -  Follow Up Time:

## 2022-10-27 NOTE — DISCHARGE NOTE PROVIDER - NSDCCPCAREPLAN_GEN_ALL_CORE_FT
PRINCIPAL DISCHARGE DIAGNOSIS  Diagnosis: Foreign body aspiration  Assessment and Plan of Treatment: you had a bronchoscopy and dental crown was retrieved from the bronchus (airway). Followup with your PMD in 1-2 weeks      SECONDARY DISCHARGE DIAGNOSES  Diagnosis: CAD (coronary artery disease)  Assessment and Plan of Treatment: Continue Plavix, do not stop unless instructed by your physician. Stop aspirin. Continue low salt, fat, cholesterol and carbohydrate diet. Follow up with cardiologist Dr Stein in 1-2 week    Diagnosis: Type 2 diabetes mellitus treated without insulin  Assessment and Plan of Treatment: Goal A1C 7.0. Your A1C is 6.5  Hypoglycemia management: please check your fingerstick every morning or if you are not feeling well. If your FS is >300mg/dl X3 or more readings please contact your PMD/Endocrinologist. If your FS is low <70mg/dl and/or you have symptoms of very low blood sugar FIRST drink1/2 cup of apple juice (or take 4 glucose tab/tube of glucose gel) and recheck FS in 15mins. Repeat these steps until blood sugar is above 100mg/dl, if NECESSARY. Then call your provider to discuss low blood sugar.   What to expend at followup appointment: please bring a log of your fingerstick and/or your glucometer to you appointment. Your blood sugar tracking will help your diabetes team determine the best plan    Diagnosis: Hypertension  Assessment and Plan of Treatment: Low sodium and fat diet, continue anti-hypertensive medications, and follow up with primary care physician.    Diagnosis: Hyperbilirubinemia  Assessment and Plan of Treatment: followup with primary care in 1 week to trend and monitor. your liver function test is normal    Diagnosis: Afib  Assessment and Plan of Treatment: Please take eliquis prescribed.  Continue to take your blood thinner as prescribed and follow with your physician.  Low fat diet, reduce caffeine intake, and exercise at least 30 minutes daily.     PRINCIPAL DISCHARGE DIAGNOSIS  Diagnosis: Foreign body aspiration  Assessment and Plan of Treatment: you had a bronchoscopy and dental crown was retrieved from the bronchus (airway). Followup with your PMD in 1-2 weeks      SECONDARY DISCHARGE DIAGNOSES  Diagnosis: CAD (coronary artery disease)  Assessment and Plan of Treatment: Continue Plavix, do not stop unless instructed by your physician. Stop aspirin. Continue low salt, fat, cholesterol and carbohydrate diet. Follow up with cardiologist Dr Stein in 1-2 week    Diagnosis: Type 2 diabetes mellitus treated without insulin  Assessment and Plan of Treatment: Goal A1C 7.0. Your A1C is 6.5  Hypoglycemia management: please check your fingerstick every morning or if you are not feeling well. If your FS is >300mg/dl X3 or more readings please contact your PMD/Endocrinologist. If your FS is low <70mg/dl and/or you have symptoms of very low blood sugar FIRST drink1/2 cup of apple juice (or take 4 glucose tab/tube of glucose gel) and recheck FS in 15mins. Repeat these steps until blood sugar is above 100mg/dl, if NECESSARY. Then call your provider to discuss low blood sugar.   What to expend at followup appointment: please bring a log of your fingerstick and/or your glucometer to you appointment. Your blood sugar tracking will help your diabetes team determine the best plan    Diagnosis: Afib  Assessment and Plan of Treatment: Please take eliquis prescribed.  Continue to take your blood thinner as prescribed and follow with your physician.  Low fat diet, reduce caffeine intake, and exercise at least 30 minutes daily.    Diagnosis: Hypertension  Assessment and Plan of Treatment: Low sodium and fat diet, continue anti-hypertensive medications, and follow up with primary care physician.    Diagnosis: Hyperbilirubinemia  Assessment and Plan of Treatment: followup with primary care in 1 week to trend and monitor. your liver function test is normal    Diagnosis: Thyroid enlarged  Assessment and Plan of Treatment: you had CT chest which noted enlarge thyroid, recommended thyroid ultrasound outpatient

## 2022-10-27 NOTE — CONSULT NOTE ADULT - ASSESSMENT
Ervin Polk is a 71 y/o M with history of CAD, recent cardiac stents 3 months ago, on Eliquis for unclear etiology, who presented to [facility] 10/26/22 for removal of aspirated dental crown per interventional pulmonology.      #Foreign body aspiration  - patient reports having aspirated dental crown, on imaging location appears to be in RMB/BI  - patient currently symptomatic with persistent cough  - planning for bronchoscopy with removal as below per interventional pulmonology    Recommendations:  - will plan for bronchoscopy with foreign body removal today (10/27/22)  - please provide and encourage incentive spirometry while inpatient  - if patient persistently coughing can trial albuterol PRN      Patient seen and discussed w/Dr. Cristy Felton PGY5  [id number]    
72M PMH CAD s/p LOUISE to rPDA (7/22) on Plavix, HTN, T2DM  presents to ED after accidentally inhaling a dental crown to right bronchus.   Patient for flexible bronchoscopy 10/27/2022 and requires cardiac clearance.  Please await Cardiology Attending recommendations for cardiac clearance    
Ervin Polk is a 73 y/o M with history of CAD, recent cardiac stents 3 months ago, on Eliquis for unclear etiology, who presented to [facility] 10/26/22 for removal of aspirated dental crown per interventional pulmonology.      #Foreign body aspiration  - patient reports having aspirated dental crown, on imaging location appears to be in RMB/BI  - patient currently symptomatic with persistent cough  - planning for bronchoscopy with removal as below per interventional pulmonology    Recommendations:  - will plan for bronchoscopy with foreign body removal today (10/27/22)  - please provide and encourage incentive spirometry while inpatient  - if patient persistently coughing can trial albuterol PRN      Patient seen and discussed w/Dr. Cristy Felton PGY5  [phone]

## 2022-10-27 NOTE — PROGRESS NOTE ADULT - PROBLEM SELECTOR PLAN 6
Pt on Eliquis 5 mg BID but does not know the exact indication for it. Pt reports that he started Eliquis ~6 months ago after he was told that he had an abnormal rhythm. Possibly afib.   - Per cardiology recs, hold Eliquis for now, as pt also pending possible flex bronchoscopy  - Given pt is on DAPT with ASA and Plavix, will need to follow up with pt's cardiologist (Dr. Stein in [facility] [phone]) regarding exact indication for Eliquis and whether current regimen of ASA, Plavix, and Eliquis should be adjusted due to bleeding risk Pt on Eliquis 5 mg BID but does not know the exact indication for it. Pt reports that he started Eliquis ~6 months ago after he was told that he had an abnormal rhythm. Possibly afib.   - Per cardiology recs, hold Eliquis for now, as pt also pending possible flex bronchoscopy  - as above, resume eliquis after procedure, dc aspirin and cont plavix  eliquis is for hx Afib Pt on Eliquis 5 mg BID but does not know the exact indication for it. Pt reports that he started Eliquis ~6 months ago after he was told that he had an abnormal rhythm. Possibly afib.   - resume eliquis after bronch, can dc aspirin and cont plavix  eliquis is for hx Afib

## 2022-10-27 NOTE — BRIEF OPERATIVE NOTE - NSICDXBRIEFPROCEDURE_GEN_ALL_CORE_FT
PROCEDURES:  Removal, foreign body, trachea 27-Oct-2022 12:03:50  Murtaza Lester  Flexible bronchoscopy 27-Oct-2022 12:05:31  Murtaza Lester

## 2022-10-27 NOTE — CONSULT NOTE ADULT - TIME BILLING
time spent in review of laboratory data, radiology images and results, discussion with primary team/patient, and monitoring for potential decompensation. Interventions performed as documented above. In addition, time also spent to risks and benefits of the procedure, alternative procedures, diagnostic and therapeutic outcomes as well as further management plan. Complex patient requiring services. Interventional Pulmonology services provided to the patient were separate from general pulmonary service due to complexity of issues and interventions required. Time spent separate from time spent doing any procedures.

## 2022-10-27 NOTE — CONSULT NOTE ADULT - SUBJECTIVE AND OBJECTIVE BOX
CHIEF COMPLAINT: aspirated tooth crown    HPI:      Ervin Polk is a 71 y/o M with history of CAD, recent cardiac stents 3 months ago, on Eliquis for unclear etiology, who presented to [facility] 10/26/22 for removal of aspirated dental crown per interventional pulmonology.  On exam patient states that he has had a persistent cough since aspirating the crown.  He otherwise feels well.  He has continued taking aspirin/plavix for recent stents.  Increased risk of bleeding given DAPT was explained to patient and patient was amenable to bronchoscopy with foreign body removal.      PAST MEDICAL & SURGICAL HISTORY:  CAD (coronary artery disease)  1 stent      HTN (hypertension)      Diabetes mellitus      Atrial fibrillation  ?      No significant past surgical history          FAMILY HISTORY:  No pertinent family history in first degree relatives        SOCIAL HISTORY:  Smoking: [x ] Never Smoked [ ] Former Smoker (__ packs x ___ years) [ ] Current Smoker  (__ packs x ___ years)      Allergies    No Known Allergies    Intolerances        HOME MEDICATIONS:  Home Medications:  aspirin 81 mg oral delayed release tablet: 1 tab(s) orally once a day (27 Oct 2022 04:33)  Eliquis 5 mg oral tablet: 1 tab(s) orally 2 times a day (27 Oct 2022 04:33)  glipiZIDE 10 mg oral tablet: 1 tab(s) orally 2 times a day (27 Oct 2022 04:33)  losartan-hydroCHLOROthiazide 100 mg-25 mg oral tablet: 1 tab(s) orally once a day (27 Oct 2022 04:33)  metFORMIN 1000 mg oral tablet: 1 tab(s) orally 2 times a day (27 Oct 2022 04:33)  metoprolol succinate 50 mg oral tablet, extended release: 1 tab(s) orally once a day (27 Oct 2022 04:33)  Plavix 75 mg oral tablet: 1 tab(s) orally once a day (27 Oct 2022 04:33)  simvastatin 10 mg oral tablet: 1 tab(s) orally once a day (at bedtime) (27 Oct 2022 04:33)      REVIEW OF SYSTEMS:  Constitutional: [ ] negative [ ] fevers [ ] chills [ ] weight loss [ ] weight gain  HEENT: [ ] negative [ ] dry eyes [ ] eye irritation [ ] postnasal drip [ ] nasal congestion  CV: [ ] negative  [ ] chest pain [ ] orthopnea [ ] palpitations [ ] murmur  Resp: [ ] negative [x ] cough [ ] shortness of breath [ ] dyspnea [ ] wheezing [ ] sputum [ ] hemoptysis  GI: [ ] negative [ ] nausea [ ] vomiting [ ] diarrhea [ ] constipation [ ] abd pain [ ] dysphagia   : [ ] negative [ ] dysuria [ ] nocturia [ ] hematuria [ ] increased urinary frequency  Musculoskeletal: [ ] negative [ ] back pain [ ] myalgias [ ] arthralgias [ ] fracture  Skin: [ ] negative [ ] rash [ ] itch  Neurological: [ ] negative [ ] headache [ ] dizziness [ ] syncope [ ] weakness [ ] numbness  Psychiatric: [ ] negative [ ] anxiety [ ] depression  Endocrine: [ ] negative [ ] diabetes [ ] thyroid problem  Hematologic/Lymphatic: [ ] negative [ ] anemia [ ] bleeding problem  Allergic/Immunologic: [ ] negative [ ] itchy eyes [ ] nasal discharge [ ] hives [ ] angioedema  [x ] All other systems negative  [ ] Unable to assess ROS because ________    OBJECTIVE:  ICU Vital Signs Last 24 Hrs  T(C): 36.6 (27 Oct 2022 05:30), Max: 36.7 (26 Oct 2022 18:50)  T(F): 97.9 (27 Oct 2022 05:30), Max: 98 (26 Oct 2022 18:50)  HR: 80 (27 Oct 2022 05:30) (79 - 88)  BP: 111/77 (27 Oct 2022 05:30) (100/63 - 158/83)  BP(mean): --  ABP: --  ABP(mean): --  RR: 18 (27 Oct 2022 05:30) (15 - 18)  SpO2: 96% (27 Oct 2022 05:30) (96% - 98%)    O2 Parameters below as of 27 Oct 2022 05:30  Patient On (Oxygen Delivery Method): room air              CAPILLARY BLOOD GLUCOSE      POCT Blood Glucose.: 136 mg/dL (27 Oct 2022 03:23)      PHYSICAL EXAM:  General: Awake and alert.  No acute distress.  HEENT: atraumatic, normocephalic   Heart: RRR.  Normal S1 and S2.  No murmurs, rubs, or gallops.  No LE edema b/l.   Lungs: Nonlabored breathing.  Good inspiratory effort.  CTAB.  No wheezes, crackles, or rhonchi.    Abdomen: BS+, soft, nontender with no rebound or guarding, nondistended.  No hepatomegaly.   Skin: Warm and dry.  No rashes.  Extremities: No cyanosis.  2+ peripheral pulses b/l.  Musculoskeletal: No joint deformities.  No spinal or paraspinal tenderness.  Neuro: A&Ox3.  no gross/focal deficits   Psychiatric: Normal mood, full affect.    LINES:     HOSPITAL MEDICATIONS:  Standing Meds:  aspirin enteric coated 81 milliGRAM(s) Oral <User Schedule>  clopidogrel Tablet 75 milliGRAM(s) Oral daily  dextrose 5%. 1000 milliLiter(s) IV Continuous <Continuous>  dextrose 5%. 1000 milliLiter(s) IV Continuous <Continuous>  dextrose 50% Injectable 25 Gram(s) IV Push once  dextrose 50% Injectable 12.5 Gram(s) IV Push once  dextrose 50% Injectable 25 Gram(s) IV Push once  glucagon  Injectable 1 milliGRAM(s) IntraMuscular once  influenza  Vaccine (HIGH DOSE) 0.7 milliLiter(s) IntraMuscular once  insulin lispro (ADMELOG) corrective regimen sliding scale   SubCutaneous every 6 hours  losartan 100 milliGRAM(s) Oral daily  metoprolol succinate ER 50 milliGRAM(s) Oral daily  simvastatin 10 milliGRAM(s) Oral at bedtime      PRN Meds:  dextrose Oral Gel 15 Gram(s) Oral once PRN      LABS:                        14.4   6.91  )-----------( 218      ( 26 Oct 2022 19:30 )             42.4     Hgb Trend: 14.4<--  10-27    138  |  103  |  14  ----------------------------<  121<H>  4.5   |  23  |  1.09    Ca    9.8      27 Oct 2022 08:31  Phos  4.2     10-27  Mg     2.00     10-27    TPro  6.8  /  Alb  4.1  /  TBili  2.4<H>  /  DBili  x   /  AST  24  /  ALT  28  /  AlkPhos  79  10-27    Creatinine Trend: 1.09<--, 1.05<--  PT/INR - ( 27 Oct 2022 08:31 )   PT: 13.7 sec;   INR: 1.18 ratio         PTT - ( 27 Oct 2022 08:31 )  PTT:29.8 sec          MICROBIOLOGY:       RADIOLOGY:  [ ] Reviewed and interpreted by me    PULMONARY FUNCTION TESTS:    EKG:
HISTORY OF PRESENT ILLNESS:    This is a 72 year old man with past medical history of CAD s/p LOUISE to rPDA (7/22) on Plavix, HTN, T2DM  presents to ED after accidentally inhaling a dental crown to right bronchus.   Patient for flexible bronchoscopy 10/27/2022 and requires cardiac clearance.    Patient sees Dr. Stein, Cardiologist in [facility] [phone] and reports he had a stent placed in July based on an abnormal stress test.  He has never had a heart attack or chest pain and can climb 2 flights of stairs without significant SOB.  He reports he is on Eliquis but does not know the reason.  He denies atrial fibrillation, pulmonary embolism or DVT in the past.  He asked his cardiologist why he is on the blood thinner and was informed that more is better.     Allergies    No Known Allergies    Intolerances    HOME MEDICATIONS:  Aspirin 81mg daily  Plavix 75mg daily  Eliquis 5mg BID  Metoprolol Succinate XL 50mg daily  Simvistatin 10mg daily  Losartan /25mg daily  Metformin 1gm BID  Glipizide 10mg daily    MEDICATIONS:            dextrose 50% Injectable 25 Gram(s) IV Push once  dextrose 50% Injectable 12.5 Gram(s) IV Push once  dextrose 50% Injectable 25 Gram(s) IV Push once  dextrose Oral Gel 15 Gram(s) Oral once PRN  glucagon  Injectable 1 milliGRAM(s) IntraMuscular once  insulin lispro (ADMELOG) corrective regimen sliding scale   SubCutaneous three times a day before meals  insulin lispro (ADMELOG) corrective regimen sliding scale   SubCutaneous at bedtime    dextrose 5%. 1000 milliLiter(s) IV Continuous <Continuous>  dextrose 5%. 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 No nausea, vomiting, or hematemesis  GENITOURINARY: No dysuria, frequency or hematuria  NEUROLOGICAL: No numbness or weakness  SKIN: No itching, burning, rashes, or lesions   All other review of systems is negative unless indicated above.    PHYSICAL EXAM:  T(C): 36.7 (10-26-22 @ 18:50), Max: 36.7 (10-26-22 @ 18:50)  HR: 88 (10-26-22 @ 18:50) (88 - 88)  BP: 158/83 (10-26-22 @ 18:50) (158/83 - 158/83)  RR: 15 (10-26-22 @ 18:50) (15 - 15)  SpO2: 97% (10-26-22 @ 18:50) (97% - 97%)  Wt(kg): --  I&O's Summary      Appearance: No Acute Distress	  HEENT:  Normal oral mucosa, PERRL, EOMI	  Cardiovascular: Normal S1 S2, No JVD, No murmurs/rubs/gallops  Respiratory: Lungs clear to auscultation bilaterally  Gastrointestinal:  Soft, Non-tender, + BS	  Skin: No rashes, No ecchymoses, No cyanosis	  Neurologic: Non-focal  Extremities: No clubbing, cyanosis or edema  Vascular: Peripheral pulses palpable 2+ bilaterally  Psychiatry: A & O x 3, Mood & affect appropriate    LABS:	 	    CBC Full  -  ( 26 Oct 2022 19:30 )  WBC Count : 6.91 K/uL  Hemoglobin : 14.4 g/dL  Hematocrit : 42.4 %  Platelet Count - Automated : 218 K/uL  Mean Cell Volume : 88.1 fL  Mean Cell Hemoglobin : 29.9 pg  Mean Cell Hemoglobin Concentration : 34.0 gm/dL  Auto Neutrophil # : 4.76 K/uL  Auto Lymphocyte # : 1.13 K/uL  Auto Monocyte # : 0.70 K/uL  Auto Eosinophil # : 0.24 K/uL  Auto Basophil # : 0.05 K/uL  Auto Neutrophil % : 68.9 %  Auto Lymphocyte % : 16.4 %  Auto Monocyte % : 10.1 %  Auto Eosinophil % : 3.5 %  Auto Basophil % : 0.7 %    10-26    139  |  105  |  14  ----------------------------<  92  4.0   |  23  |  1.05    Ca    9.3      26 Oct 2022 19:30    TPro  6.8  /  Alb  4.0  /  TBili  1.8<H>  /  DBili  x   /  AST  21  /  ALT  27  /  AlkPhos  74  10-26      proBNP:   Lipid Profile:   HgA1c:   TSH:       CARDIAC MARKERS:            TELEMETRY: 	  SR  ECG:  	  RADIOLOGY:  OTHER: 	    PREVIOUS DIAGNOSTIC TESTING:    [ ] Echocardiogram:  [ ] Catheterization:  [ ] Stress Test:  	  	      
  CHIEF COMPLAINT: aspirated tooth crown    HPI:    Ervin Polk is a 73 y/o M with history of CAD, recent cardiac stents 3 months ago, on Eliquis for unclear etiology, who presented to [facility] 10/26/22 for removal of aspirated dental crown per interventional pulmonology.  On exam patient states that he has had a persistent cough since aspirating the crown.  He otherwise feels well.  He has continued taking aspirin/plavix for recent stents.  Increased risk of bleeding given DAPT was explained to patient and patient was amenable to bronchoscopy with foreign body removal.        PAST MEDICAL & SURGICAL HISTORY:  CAD (coronary artery disease)  1 stent      HTN (hypertension)      Diabetes mellitus      Atrial fibrillation  ?      No significant past surgical history          FAMILY HISTORY:  No pertinent family history in first degree relatives        SOCIAL HISTORY:  Smoking: [ x] Never Smoked [ ] Former Smoker (__ packs x ___ years) [ ] Current Smoker  (__ packs x ___ years)      Allergies    No Known Allergies    Intolerances        HOME MEDICATIONS:  Home Medications:  aspirin 81 mg oral delayed release tablet: 1 tab(s) orally once a day (27 Oct 2022 04:33)  Eliquis 5 mg oral tablet: 1 tab(s) orally 2 times a day (27 Oct 2022 04:33)  glipiZIDE 10 mg oral tablet: 1 tab(s) orally 2 times a day (27 Oct 2022 04:33)  losartan-hydroCHLOROthiazide 100 mg-25 mg oral tablet: 1 tab(s) orally once a day (27 Oct 2022 04:33)  metFORMIN 1000 mg oral tablet: 1 tab(s) orally 2 times a day (27 Oct 2022 04:33)  metoprolol succinate 50 mg oral tablet, extended release: 1 tab(s) orally once a day (27 Oct 2022 04:33)  Plavix 75 mg oral tablet: 1 tab(s) orally once a day (27 Oct 2022 04:33)  simvastatin 10 mg oral tablet: 1 tab(s) orally once a day (at bedtime) (27 Oct 2022 04:33)      REVIEW OF SYSTEMS:  Constitutional: [ ] negative [ ] fevers [ ] chills [ ] weight loss [ ] weight gain  HEENT: [ ] negative [ ] dry eyes [ ] eye irritation [ ] postnasal drip [ ] nasal congestion  CV: [ ] negative  [ ] chest pain [ ] orthopnea [ ] palpitations [ ] murmur  Resp: [ ] negative [x ] cough [ ] shortness of breath [ ] dyspnea [ ] wheezing [ ] sputum [ ] hemoptysis  GI: [ ] negative [ ] nausea [ ] vomiting [ ] diarrhea [ ] constipation [ ] abd pain [ ] dysphagia   : [ ] negative [ ] dysuria [ ] nocturia [ ] hematuria [ ] increased urinary frequency  Musculoskeletal: [ ] negative [ ] back pain [ ] myalgias [ ] arthralgias [ ] fracture  Skin: [ ] negative [ ] rash [ ] itch  Neurological: [ ] negative [ ] headache [ ] dizziness [ ] syncope [ ] weakness [ ] numbness  Psychiatric: [ ] negative [ ] anxiety [ ] depression  Endocrine: [ ] negative [ ] diabetes [ ] thyroid problem  Hematologic/Lymphatic: [ ] negative [ ] anemia [ ] bleeding problem  Allergic/Immunologic: [ ] negative [ ] itchy eyes [ ] nasal discharge [ ] hives [ ] angioedema  [x ] All other systems negative  [ ] Unable to assess ROS because ________    OBJECTIVE:  ICU Vital Signs Last 24 Hrs  T(C): 36.6 (27 Oct 2022 05:30), Max: 36.7 (26 Oct 2022 18:50)  T(F): 97.9 (27 Oct 2022 05:30), Max: 98 (26 Oct 2022 18:50)  HR: 80 (27 Oct 2022 05:30) (79 - 88)  BP: 111/77 (27 Oct 2022 05:30) (100/63 - 158/83)  BP(mean): --  ABP: --  ABP(mean): --  RR: 18 (27 Oct 2022 05:30) (15 - 18)  SpO2: 96% (27 Oct 2022 05:30) (96% - 98%)    O2 Parameters below as of 27 Oct 2022 05:30  Patient On (Oxygen Delivery Method): room air              CAPILLARY BLOOD GLUCOSE      POCT Blood Glucose.: 134 mg/dL (27 Oct 2022 10:35)        PHYSICAL EXAM:  General: Awake and alert.  No acute distress.  HEENT: atraumatic, normocephalic   Heart: RRR.  Normal S1 and S2.  No murmurs, rubs, or gallops.  No LE edema b/l.   Lungs: Nonlabored breathing.  Good inspiratory effort.  CTAB.  No wheezes, crackles, or rhonchi.    Abdomen: BS+, soft, nontender with no rebound or guarding, nondistended.  No hepatomegaly.   Skin: Warm and dry.  No rashes.  Extremities: No cyanosis.  2+ peripheral pulses b/l.  Musculoskeletal: No joint deformities.  No spinal or paraspinal tenderness.  Neuro: A&Ox3.  no gross/focal deficits   Psychiatric: Normal mood, full affect.      LINES:     HOSPITAL MEDICATIONS:  Standing Meds:  aspirin enteric coated 81 milliGRAM(s) Oral <User Schedule>  clopidogrel Tablet 75 milliGRAM(s) Oral daily  dextrose 5%. 1000 milliLiter(s) IV Continuous <Continuous>  dextrose 5%. 1000 milliLiter(s) IV Continuous <Continuous>  dextrose 50% Injectable 25 Gram(s) IV Push once  dextrose 50% Injectable 12.5 Gram(s) IV Push once  dextrose 50% Injectable 25 Gram(s) IV Push once  glucagon  Injectable 1 milliGRAM(s) IntraMuscular once  influenza  Vaccine (HIGH DOSE) 0.7 milliLiter(s) IntraMuscular once  insulin lispro (ADMELOG) corrective regimen sliding scale   SubCutaneous every 6 hours  losartan 100 milliGRAM(s) Oral daily  metoprolol succinate ER 50 milliGRAM(s) Oral daily  simvastatin 10 milliGRAM(s) Oral at bedtime      PRN Meds:  dextrose Oral Gel 15 Gram(s) Oral once PRN      LABS:                        14.4   6.91  )-----------( 218      ( 26 Oct 2022 19:30 )             42.4     Hgb Trend: 14.4<--  10-27    138  |  103  |  14  ----------------------------<  121<H>  4.5   |  23  |  1.09    Ca    9.8      27 Oct 2022 08:31  Phos  4.2     10-27  Mg     2.00     10-27    TPro  6.8  /  Alb  4.1  /  TBili  2.4<H>  /  DBili  x   /  AST  24  /  ALT  28  /  AlkPhos  79  10-27    Creatinine Trend: 1.09<--, 1.05<--  PT/INR - ( 27 Oct 2022 08:31 )   PT: 13.7 sec;   INR: 1.18 ratio         PTT - ( 27 Oct 2022 08:31 )  PTT:29.8 sec          MICROBIOLOGY:       RADIOLOGY:  [x ] Reviewed and interpreted by me    CT chest demonstrating likely aspirated foreign body in bronchus intermedius     PULMONARY FUNCTION TESTS:    EKG:

## 2022-10-27 NOTE — DISCHARGE NOTE PROVIDER - HOSPITAL COURSE
71 yo man with history of CAD s/p LOUISE to DA (July 2022, on ASA and Plavix), ?afib (on Eliquis), type 2 DM (not on insulin), and HTN presents as a transfer from [facility] ED for flex bronchoscopy by interventional pulmonology for removal of dental crown within the bronchus intermedius    Hospital course:    Pt admitted with aspiration of dental crown. CT chest noted 1 cm foreign body (tooth crown) within the bronchus intermedius asymptomatic, protecting airway and maintaining SpO2 high 90s on RA. Cardiology consulted for clearance. TTE EF 65 unremarkable. Pt cleared for OR. Attending discussed with outpatient cardiologist pt should continue eliquis (afib) and plavix on discharge and discontinue aspirin to prevent increase bleeding risk. Pulmonary consulted and pt s/p bronch with removal of crown. Eliquis resumed. Pt with elevated bilirubin but normal LFT, no abdominal pain recommended outpatient followup    Case discussed with Dr Ferreira on 10/27 pt medically optimized for discharge. Reviewed discharge medications with patient; All new medications requiring new prescription sent to pharmacy of patients choice. Reviewed need for prescription for previous home medication and new prescriptions sent if requested. Patient in agreement and understands. 73 yo man with history of CAD s/p LOUISE to rPDA (July 2022, on ASA and Plavix), ?afib (on Eliquis), type 2 DM (not on insulin), and HTN presents as a transfer from [facility] ED for flex bronchoscopy by interventional pulmonology for removal of dental crown within the bronchus intermedius    Hospital course:    Pt admitted with aspiration of dental crown. CT chest noted 1 cm foreign body (tooth crown) within the bronchus intermedius asymptomatic, protecting airway and maintaining SpO2 high 90s on RA. Cardiology consulted for clearance. TTE EF 65 unremarkable. Pt cleared for OR. Attending discussed with outpatient cardiologist pt should continue eliquis (afib) and plavix on discharge and discontinue aspirin to prevent increase bleeding risk. Pt had stent to R-PDA on 7/27, last echo in Nov 2021: normal LVEF 60%, grade 1 diastolic dysfunction; stress test in June 2022 +ischemia in inferior wall, for which pt underwent LHC/stent. Pulmonary consulted and pt s/p bronch with removal of crown. Eliquis resumed. Pt with elevated bilirubin but normal LFT, no abdominal pain recommended outpatient followup. Pt with R thyroid gland enlargement, TSH normal. Thyroid ultrasound outpatient     Case discussed with Dr Ferreira on 10/27 pt medically optimized for discharge. Reviewed discharge medications with patient; All new medications requiring new prescription sent to pharmacy of patients choice. Reviewed need for prescription for previous home medication and new prescriptions sent if requested. Patient in agreement and understands.

## 2022-10-27 NOTE — BRIEF OPERATIVE NOTE - OPERATION/FINDINGS
Flexible Bronchoscopy with removal of foreign body (dental crown) in right bronchus intermedius with basket. Flexible Bronchoscopy with removal of foreign body (dental crown) in right bronchus intermedius with basket.    #[id number]

## 2022-10-27 NOTE — CONSULT NOTE ADULT - ATTENDING COMMENTS
71 y/o M with history of CAD, recent cardiac stents 3 months ago, on Eliquis for unclear etiology, presenting with accidental dental crown aspiration, currently stuck in the RMB/BI. Patient is symptomatic with persistent cough. Transferred to [facility] for flex bronch, foreign body removal. Patient cleared by cardiology for OR for urgent foreign body removal. Will continue plavix given stents are <3 months old. Did not take eliquis today. We discussed the increased risk of bleeding given he is on DAPT and has been on eliquis, and we also discussed the risk of worsening respiratory symptoms and trauma from the aspirated foreign object in case we do not retrieve it while we wait for AC to stop. Overall, the benefits of urgent foreign body removal outweigh increased risk of bleeding given no biopsy is planned. Agreeable to proceed, planned for today. 73 y/o M with history of CAD, recent cardiac stents 3 months ago, on Eliquis for unclear etiology, presenting with accidental dental crown aspiration, currently stuck in the RMB/BI. Patient is symptomatic with persistent cough. Transferred to [facility] for flex bronch, foreign body removal. Patient cleared by cardiology for OR for urgent foreign body removal. Will continue plavix given stents are <3 months old. Did not take eliquis today. We discussed the increased risk of bleeding given he is on DAPT and has been on eliquis, and we also discussed the risk of worsening respiratory symptoms and trauma from the aspirated foreign object in case we do not retrieve it while we wait for AC to stop. Overall, the benefits of urgent foreign body removal outweigh increased risk of bleeding given no biopsy is planned. Agreeable to proceed, planned for today

## 2022-10-27 NOTE — DISCHARGE NOTE PROVIDER - NSDCMRMEDTOKEN_GEN_ALL_CORE_FT
Eliquis 5 mg oral tablet: 1 tab(s) orally 2 times a day  glipiZIDE 10 mg oral tablet: 1 tab(s) orally 2 times a day  losartan-hydroCHLOROthiazide 100 mg-25 mg oral tablet: 1 tab(s) orally once a day  metFORMIN 1000 mg oral tablet: 1 tab(s) orally 2 times a day  metoprolol succinate 50 mg oral tablet, extended release: 1 tab(s) orally once a day  Plavix 75 mg oral tablet: 1 tab(s) orally once a day  simvastatin 10 mg oral tablet: 1 tab(s) orally once a day (at bedtime)

## 2022-10-27 NOTE — PROGRESS NOTE ADULT - ASSESSMENT
71 yo man with history of CAD s/p LOUISE to rPDA (July 2022, on ASA and Plavix), ?afib (on Eliquis), type 2 DM (not on insulin), and HTN presents as a transfer from [facility] ED for flex bronchoscopy by interventional pulmonology for removal of dental crown within the bronchus intermedius.

## 2022-10-27 NOTE — DISCHARGE NOTE NURSING/CASE MANAGEMENT/SOCIAL WORK - PATIENT PORTAL LINK FT
You can access the FollowMyHealth Patient Portal offered by [facility] by registering at the following website: [email or web address]. By joining [employer]’s FollowMyHealth portal, you will also be able to view your health information using other applications (apps) compatible with our system.

## 2022-10-27 NOTE — PROGRESS NOTE ADULT - PROBLEM SELECTOR PLAN 3
S/p LOUISE to rPDA in July 2022 after an abnormal nuclear stress test. On ASA and Plavix. Pt with no anginal symptoms at this time.  - C/w ASA 81 mg daily and Plavix 75 mg daily   - C/w simvastatin 10 mg qHS for now. Unclear why pt is not on a high-intensity statin. Check lipid profile. Will need to follow up with pt's cardiologist (Dr. Stein in [facility] [phone]).  - C/w metoprolol succinate 50 mg daily  - f/u TTE  - Cardiology consulted, appreciate prelim recs. F/u final recs, including if pt requires any further cardiac workup besides TTE prior to flex bronchoscopy. S/p LOUISE to rPDA in July 27 2022 after an abnormal nuclear stress test. On ASA and Plavix. Pt with no anginal symptoms at this time.  - C/w ASA 81 mg daily and Plavix 75 mg daily   - Change statin to atorvastatin 40mg hs  - I d/w pt's cardiologist Dr. Stein's office in [facility] [phone], pt has hx of atrial fibrillation and is on Eliquis for that indication, spoke to his PA Christian Fitch, who d/w Dr. Stein and advised to hold eliquis for procedure and can resume Eliquis after procedure when cleared by pulm, can stop ASA at that time, cont plavix and eliquis, no need for triple therapy after 3 months post cath.  Pt had stent to R-PDA on 7/27, last echo in Nov 2021: normal LVEF 60%, grade 1 diastolic dysfunction; stress test in June 2022 +ischemia in inferior wall, for which pt underwent LHC/stent as above.   - C/w metoprolol succinate 50 mg daily  - check TTE  - Cardiology consulted, appreciate prelim recs. F/u final recs, including if pt requires any further cardiac workup besides TTE prior to flex bronchoscopy. S/p LOUISE to rPDA in July 27 2022 after an abnormal nuclear stress test. On ASA and Plavix. Pt with no anginal symptoms at this time.  - C/w ASA 81 mg daily and Plavix 75 mg daily   - Change statin to atorvastatin 40mg hs  - I d/w pt's cardiologist Dr. Stein's office in [facility] [phone], pt has hx of atrial fibrillation and is on Eliquis for that indication, spoke to his PA Christian Fitch, who d/w Dr. Stein and advised to hold eliquis for procedure and can resume Eliquis after procedure when cleared by pulm, can stop ASA at that time, cont plavix and eliquis, no need for triple therapy after 3 months post cath.  Pt had stent to R-PDA on 7/27, last echo in Nov 2021: normal LVEF 60%, grade 1 diastolic dysfunction; stress test in June 2022 +ischemia in inferior wall, for which pt underwent LHC/stent as above.   - C/w metoprolol succinate 50 mg daily  - TTE (10/27) normal LV/RV function, EF 65%, calcific AV with normal opening  -f/u cardiology

## 2022-10-27 NOTE — DISCHARGE NOTE PROVIDER - PROVIDER TOKENS
FREE:[LAST:[Dr Stein],PHONE:[(   )    -],FAX:[(   )    -],ADDRESS:[[phone]   cardiologist]],FREE:[LAST:[primary care provider],PHONE:[(   )    -],FAX:[(   )    -]]

## 2022-10-27 NOTE — PROGRESS NOTE ADULT - SUBJECTIVE AND OBJECTIVE BOX
Dr. Jess Ferreira  Pager [phone]    PROGRESS NOTE:     Patient is a 72y old  Male who presents with a chief complaint of Aspiration of dental crown (27 Oct 2022 09:00)      SUBJECTIVE / OVERNIGHT EVENTS: pt denies chest pain or sob  ADDITIONAL REVIEW OF SYSTEMS: afebrile, for bronchoscopy today to retrieve crown    MEDICATIONS  (STANDING):  aspirin enteric coated 81 milliGRAM(s) Oral <User Schedule>  clopidogrel Tablet 75 milliGRAM(s) Oral daily  dextrose 5%. 1000 milliLiter(s) (50 mL/Hr) IV Continuous <Continuous>  dextrose 5%. 1000 milliLiter(s) (100 mL/Hr) IV Continuous <Continuous>  dextrose 50% Injectable 25 Gram(s) IV Push once  dextrose 50% Injectable 12.5 Gram(s) IV Push once  dextrose 50% Injectable 25 Gram(s) IV Push once  glucagon  Injectable 1 milliGRAM(s) IntraMuscular once  influenza  Vaccine (HIGH DOSE) 0.7 milliLiter(s) IntraMuscular once  insulin lispro (ADMELOG) corrective regimen sliding scale   SubCutaneous every 6 hours  losartan 100 milliGRAM(s) Oral daily  metoprolol succinate ER 50 milliGRAM(s) Oral daily  simvastatin 10 milliGRAM(s) Oral at bedtime    MEDICATIONS  (PRN):  dextrose Oral Gel 15 Gram(s) Oral once PRN Blood Glucose LESS THAN 70 milliGRAM(s)/deciliter      CAPILLARY BLOOD GLUCOSE      POCT Blood Glucose.: 134 mg/dL (27 Oct 2022 10:35)  POCT Blood Glucose.: 136 mg/dL (27 Oct 2022 03:23)    I&O's Summary      PHYSICAL EXAM:  Vital Signs Last 24 Hrs  T(C): 36.4 (27 Oct 2022 11:24), Max: 36.8 (27 Oct 2022 11:02)  T(F): 97.6 (27 Oct 2022 11:24), Max: 98.2 (27 Oct 2022 11:02)  HR: 73 (27 Oct 2022 11:24) (73 - 88)  BP: 151/97 (27 Oct 2022 11:02) (100/63 - 158/83)  BP(mean): --  RR: 17 (27 Oct 2022 11:24) (15 - 18)  SpO2: 97% (27 Oct 2022 11:24) (96% - 99%)    Parameters below as of 27 Oct 2022 11:24  Patient On (Oxygen Delivery Method): room air      CONSTITUTIONAL: NAD, well-developed  RESPIRATORY: Normal respiratory effort; lungs are clear to auscultation bilaterally  CARDIOVASCULAR: Regular rate and rhythm, normal S1 and S2, no murmur/rub/gallop; No lower extremity edema; Peripheral pulses are 2+ bilaterally  ABDOMEN: Nontender to palpation, normoactive bowel sounds, no rebound/guarding; No hepatosplenomegaly  MUSCULOSKELETAL: no clubbing or cyanosis of digits; no joint swelling or tenderness to palpation  PSYCH: A+O to person, place, and time; affect appropriate    LABS:                        14.4   6.91  )-----------( 218      ( 26 Oct 2022 19:30 )             42.4     10-27    138  |  103  |  14  ----------------------------<  121<H>  4.5   |  23  |  1.09    Ca    9.8      27 Oct 2022 08:31  Phos  4.2     10-27  Mg     2.00     10-27    TPro  6.8  /  Alb  4.1  /  TBili  2.4<H>  /  DBili  x   /  AST  24  /  ALT  28  /  AlkPhos  79  10-27    PT/INR - ( 27 Oct 2022 08:31 )   PT: 13.7 sec;   INR: 1.18 ratio         PTT - ( 27 Oct 2022 08:31 )  PTT:29.8 sec        RADIOLOGY & ADDITIONAL TESTS:  Results Reviewed:     Imaging Personally Reviewed:    Electrocardiogram Personally Reviewed:    COORDINATION OF CARE:  Care Discussed with Consultants/Other Providers [Y/N]: ronan Bhatia, f/catrachito zeng and pulm for bronch today  Prior or Outpatient Records Reviewed [Y/N]:   Dr. Jess Ferreira  Pager [phone]    PROGRESS NOTE:     Patient is a 72y old  Male who presents with a chief complaint of Aspiration of dental crown (27 Oct 2022 09:00)      SUBJECTIVE / OVERNIGHT EVENTS: pt denies chest pain or sob  ADDITIONAL REVIEW OF SYSTEMS: afebrile, for bronchoscopy today to retrieve crown    MEDICATIONS  (STANDING):  aspirin enteric coated 81 milliGRAM(s) Oral <User Schedule>  clopidogrel Tablet 75 milliGRAM(s) Oral daily  dextrose 5%. 1000 milliLiter(s) (50 mL/Hr) IV Continuous <Continuous>  dextrose 5%. 1000 milliLiter(s) (100 mL/Hr) IV Continuous <Continuous>  dextrose 50% Injectable 25 Gram(s) IV Push once  dextrose 50% Injectable 12.5 Gram(s) IV Push once  dextrose 50% Injectable 25 Gram(s) IV Push once  glucagon  Injectable 1 milliGRAM(s) IntraMuscular once  influenza  Vaccine (HIGH DOSE) 0.7 milliLiter(s) IntraMuscular once  insulin lispro (ADMELOG) corrective regimen sliding scale   SubCutaneous every 6 hours  losartan 100 milliGRAM(s) Oral daily  metoprolol succinate ER 50 milliGRAM(s) Oral daily  simvastatin 10 milliGRAM(s) Oral at bedtime    MEDICATIONS  (PRN):  dextrose Oral Gel 15 Gram(s) Oral once PRN Blood Glucose LESS THAN 70 milliGRAM(s)/deciliter      CAPILLARY BLOOD GLUCOSE      POCT Blood Glucose.: 134 mg/dL (27 Oct 2022 10:35)  POCT Blood Glucose.: 136 mg/dL (27 Oct 2022 03:23)    I&O's Summary      PHYSICAL EXAM:  Vital Signs Last 24 Hrs  T(C): 36.4 (27 Oct 2022 11:24), Max: 36.8 (27 Oct 2022 11:02)  T(F): 97.6 (27 Oct 2022 11:24), Max: 98.2 (27 Oct 2022 11:02)  HR: 73 (27 Oct 2022 11:24) (73 - 88)  BP: 151/97 (27 Oct 2022 11:02) (100/63 - 158/83)  BP(mean): --  RR: 17 (27 Oct 2022 11:24) (15 - 18)  SpO2: 97% (27 Oct 2022 11:24) (96% - 99%)    Parameters below as of 27 Oct 2022 11:24  Patient On (Oxygen Delivery Method): room air      CONSTITUTIONAL: NAD, well-developed  RESPIRATORY: Normal respiratory effort; lungs are clear to auscultation bilaterally  CARDIOVASCULAR: Regular rate and rhythm, normal S1 and S2, no murmur/rub/gallop; No lower extremity edema; Peripheral pulses are 2+ bilaterally  ABDOMEN: Nontender to palpation, normoactive bowel sounds, no rebound/guarding; No hepatosplenomegaly  MUSCULOSKELETAL: no clubbing or cyanosis of digits; no joint swelling or tenderness to palpation  PSYCH: A+O to person, place, and time; affect appropriate    LABS:                        14.4   6.91  )-----------( 218      ( 26 Oct 2022 19:30 )             42.4     10-27    138  |  103  |  14  ----------------------------<  121<H>  4.5   |  23  |  1.09    Ca    9.8      27 Oct 2022 08:31  Phos  4.2     10-27  Mg     2.00     10-27    TPro  6.8  /  Alb  4.1  /  TBili  2.4<H>  /  DBili  x   /  AST  24  /  ALT  28  /  AlkPhos  79  10-27    PT/INR - ( 27 Oct 2022 08:31 )   PT: 13.7 sec;   INR: 1.18 ratio         PTT - ( 27 Oct 2022 08:31 )  PTT:29.8 sec        RADIOLOGY & ADDITIONAL TESTS:  Results Reviewed:     Imaging Personally Reviewed:  < from: TTE with Doppler (w/Cont) (10.27.22 @ 09:32) >  CONCLUSIONS:  LVEF 65%  1. Calcified trileaflet aortic valve with normal opening.  2. Normal left ventricular systolic function. No segmental  wall motion abnormalities. Endocardial visualization  enhanced with intravenous injection of echo contrast  (Definity).  3. The right ventricle is not well visualized; grossly  normal right ventricular systolic function.    Electrocardiogram Personally Reviewed:    COORDINATION OF CARE:  Care Discussed with Consultants/Other Providers [Y/N]: ronan Bhatia, f/u karri and pulm for bronch today  Prior or Outpatient Records Reviewed [Y/N]:

## 2022-10-27 NOTE — PRE-OP CHECKLIST - SURGICAL CONSENT
Quality 110: Preventive Care And Screening: Influenza Immunization: Influenza immunization was not ordered or administered, reason not given Quality 402: Tobacco Use And Help With Quitting Among Adolescents: Patient screened for tobacco and never smoked Quality 130: Documentation Of Current Medications In The Medical Record: Current Medications Documented Quality 431: Preventive Care And Screening: Unhealthy Alcohol Use - Screening: Patient screened for unhealthy alcohol use using a single question and scores less than 2 times per year Detail Level: Detailed done

## 2022-10-27 NOTE — PROGRESS NOTE ADULT - PROBLEM SELECTOR PLAN 8
- DVT ppx: Pt on Eliquis at home. Hold Eliquis for now. Will need to follow up with pt's cardiologist (Dr. Stein in [facility] [phone]) regarding exact indication for Eliquis and whether current regimen of ASA, Plavix, and Eliquis should be adjusted due to bleeding risk.  - Diet: NPO for now pending possible flex bronchoscopy - DVT ppx:  resume eliquis after bronchoscopy  - Diet: NPO for now pending possible flex bronchoscopy

## 2022-10-27 NOTE — DISCHARGE NOTE NURSING/CASE MANAGEMENT/SOCIAL WORK - NSDCPEFALRISK_GEN_ALL_CORE
For information on Fall & Injury Prevention, visit: [email or web address] OR  [email or web address] OR  https://www.cdc.gov/steadi/patient.html

## 2022-10-27 NOTE — PROGRESS NOTE ADULT - PROBLEM SELECTOR PLAN 1
Per pt, suddenly aspirated on one of his dental crowns while driving Wednesday afternoon. CT chest noncontrast showing a 1 cm foreign body (tooth crown) within the bronchus intermedius. Pt currently asymptomatic, protecting airway and maintaining SpO2 high 90s on RA.   - Interventional pulmonology consulted, appreciate recs. Plan for flex bronchoscopy for removal of dental crown pending cardiology eval.    - F/u cardiology final recs, Hold eliquis,   - Keep NPO after MN  - Monitor pulse ox q4hrs for now  - Low threshold to start empiric antibiotics for aspiration PNA if pt spikes a fever Per pt, suddenly aspirated on one of his dental crowns while driving Wednesday afternoon. CT chest noncontrast showing a 1 cm foreign body (tooth crown) within the bronchus intermedius. Pt currently asymptomatic, protecting airway and maintaining SpO2 high 90s on RA.   - s/p Flexible Bronchoscopy with removal of foreign body (dental crown) in right bronchus intermedius with basket. on 10/27  - Monitor pulse ox q4hrs for now  - Low threshold to start empiric antibiotics for aspiration PNA if pt spikes a fever Per pt, suddenly aspirated on one of his dental crowns while driving Wednesday afternoon. CT chest noncontrast showing a 1 cm foreign body (tooth crown) within the bronchus intermedius. Pt currently asymptomatic, protecting airway and maintaining SpO2 high 90s on RA.   - s/p Flexible Bronchoscopy with removal of foreign body (dental crown) in right bronchus intermedius with basket. on 10/27  - Monitor pulse ox q4hrs for now  - Low threshold to start empiric antibiotics for aspiration PNA if pt spikes a fever  - dispo: DC plan home if cleared by pulm/Cards Per pt, suddenly aspirated on one of his dental crowns while driving Wednesday afternoon. CT chest noncontrast showing a 1 cm foreign body (tooth crown) within the bronchus intermedius. Pt currently asymptomatic, protecting airway and maintaining SpO2 high 90s on RA.   - s/p Flexible Bronchoscopy with removal of foreign body (dental crown) in right bronchus intermedius with basket. on 10/27  - Monitor pulse ox q4hrs for now  - Low threshold to start empiric antibiotics for aspiration PNA if pt spikes a fever  - dispo: DC plan home, cleared by pulm/Cards  Time spent on discharge 31 minutes coordinating discharge plan and discussing with patient and family.

## 2022-10-27 NOTE — PROGRESS NOTE ADULT - PROBLEM SELECTOR PLAN 4
Pt on Metformin and Glipizide at home.  - Hold oral hypoglycemic agents while inpatient  - Start low-dose ISS and FS checks q6hrs while NPO and qAC TID and qHS while on PO diet  - A1c 6.5%

## 2023-11-29 PROBLEM — E78.5 HYPERLIPIDEMIA, UNSPECIFIED: Chronic | Status: ACTIVE | Noted: 2022-10-26

## 2023-11-29 PROBLEM — I25.10 ATHEROSCLEROTIC HEART DISEASE OF NATIVE CORONARY ARTERY WITHOUT ANGINA PECTORIS: Chronic | Status: ACTIVE | Noted: 2022-10-26

## 2023-11-29 PROBLEM — I10 ESSENTIAL (PRIMARY) HYPERTENSION: Chronic | Status: ACTIVE | Noted: 2022-10-26

## 2023-11-29 RX ORDER — CLOPIDOGREL BISULFATE 75 MG/1
1 TABLET, FILM COATED ORAL
Qty: 0 | Refills: 0 | DISCHARGE

## 2023-11-29 RX ORDER — SIMVASTATIN 20 MG/1
1 TABLET, FILM COATED ORAL
Qty: 0 | Refills: 0 | DISCHARGE

## 2023-11-29 RX ORDER — APIXABAN 2.5 MG/1
1 TABLET, FILM COATED ORAL
Qty: 0 | Refills: 0 | DISCHARGE

## 2023-11-29 RX ORDER — ASPIRIN/CALCIUM CARB/MAGNESIUM 324 MG
1 TABLET ORAL
Qty: 0 | Refills: 0 | DISCHARGE

## 2023-11-29 RX ORDER — METFORMIN HYDROCHLORIDE 850 MG/1
1 TABLET ORAL
Qty: 0 | Refills: 0 | DISCHARGE

## 2023-11-29 RX ORDER — METOPROLOL TARTRATE 50 MG
1 TABLET ORAL
Qty: 0 | Refills: 0 | DISCHARGE

## 2023-11-29 RX ORDER — LOSARTAN/HYDROCHLOROTHIAZIDE 100MG-25MG
1 TABLET ORAL
Qty: 0 | Refills: 0 | DISCHARGE

## (undated) DEVICE — GLV 7 PROTEXIS (WHITE)

## (undated) DEVICE — DRAPE TOWEL BLUE 17" X 24"

## (undated) DEVICE — VENODYNE/SCD SLEEVE CALF MEDIUM

## (undated) DEVICE — SYR LUER SLIP TIP 30CC

## (undated) DEVICE — MASK SURGICAL WITH EYESHIELD ANTIFOG (ORANGE)

## (undated) DEVICE — TRAP SPECIMEN SPUTUM 40CC

## (undated) DEVICE — DRSG TAPE TRANSPORE 1"

## (undated) DEVICE — TUBING CANNULA SALTER LABS NASAL ADULT 7FT

## (undated) DEVICE — DRAPE 3/4 SHEET 52X76"

## (undated) DEVICE — PACK BRONCHOSCOPY

## (undated) DEVICE — ADAPTER FIBEROPTIC BRONCHOSCOPE DUAL AXIS SWIVEL

## (undated) DEVICE — SOL IRR POUR H2O 500ML

## (undated) DEVICE — SOL IRR NS 0.9% 250ML

## (undated) DEVICE — SOL ANTI FOG

## (undated) DEVICE — SUTURE REMOVAL KIT

## (undated) DEVICE — VALVE BIOPSY BRONCHOVIDEOSCOPE

## (undated) DEVICE — FORCEP BIOPSY BRONCHOSCOPE DISP

## (undated) DEVICE — FORCEP BIOPSY 1.8MM JAW X 100CM DISP

## (undated) DEVICE — VALVE SUCTION EVIS 160/200/240

## (undated) DEVICE — GLV 8.5 PROTEXIS (WHITE)

## (undated) DEVICE — LABELS BLANK W PEN